# Patient Record
Sex: MALE | Race: BLACK OR AFRICAN AMERICAN | HISPANIC OR LATINO | Employment: FULL TIME | URBAN - METROPOLITAN AREA
[De-identification: names, ages, dates, MRNs, and addresses within clinical notes are randomized per-mention and may not be internally consistent; named-entity substitution may affect disease eponyms.]

---

## 2023-02-01 ENCOUNTER — OFFICE VISIT (OUTPATIENT)
Dept: FAMILY MEDICINE CLINIC | Facility: CLINIC | Age: 53
End: 2023-02-01

## 2023-02-01 VITALS
BODY MASS INDEX: 21.46 KG/M2 | OXYGEN SATURATION: 99 % | TEMPERATURE: 98 F | HEART RATE: 75 BPM | WEIGHT: 153.31 LBS | SYSTOLIC BLOOD PRESSURE: 124 MMHG | HEIGHT: 71 IN | DIASTOLIC BLOOD PRESSURE: 70 MMHG | RESPIRATION RATE: 21 BRPM

## 2023-02-01 DIAGNOSIS — R14.3 FLATULENCE: ICD-10-CM

## 2023-02-01 DIAGNOSIS — Z11.4 SCREENING FOR HIV (HUMAN IMMUNODEFICIENCY VIRUS): ICD-10-CM

## 2023-02-01 DIAGNOSIS — Z12.12 ENCOUNTER FOR COLORECTAL CANCER SCREENING: ICD-10-CM

## 2023-02-01 DIAGNOSIS — Z13.31 POSITIVE DEPRESSION SCREENING: ICD-10-CM

## 2023-02-01 DIAGNOSIS — R19.4 FREQUENT BOWEL MOVEMENTS: ICD-10-CM

## 2023-02-01 DIAGNOSIS — Z23 ENCOUNTER FOR IMMUNIZATION: ICD-10-CM

## 2023-02-01 DIAGNOSIS — Z11.59 NEED FOR HEPATITIS C SCREENING TEST: ICD-10-CM

## 2023-02-01 DIAGNOSIS — Z76.89 ENCOUNTER TO ESTABLISH CARE WITH NEW DOCTOR: Primary | ICD-10-CM

## 2023-02-01 DIAGNOSIS — Z12.11 ENCOUNTER FOR COLORECTAL CANCER SCREENING: ICD-10-CM

## 2023-02-01 RX ORDER — SIMETHICONE 180 MG
180 CAPSULE ORAL 2 TIMES DAILY PRN
Qty: 30 CAPSULE | Refills: 0 | Status: SHIPPED | OUTPATIENT
Start: 2023-02-01 | End: 2023-02-03 | Stop reason: SDUPTHER

## 2023-02-01 NOTE — PROGRESS NOTES
[facility] Office visit    Assessment/Plan:     1  Encounter to establish care with new doctor  -     CBC and differential; Future  -     Comprehensive metabolic panel; Future  -     Vitamin D 25 hydroxy; Future    2  Frequent bowel movements  Assessment & Plan:  - Patient reports flatulence and frequent bowel movements since abdominal surgeries in 2020 that resulted from questionable incident involving ingestion of alcohol/battery acid at home (see "Positive depression screening")  - Since then patient reports mixture of solid and loose bowel movements, no mucus or blood, no weight loss, fevers, night sweats  - Some components of diary in his diet however does not appear to be related as his symptoms coincide with time of abdominal surgeries, however patient instructed to cut back on dairy as well to see if symptoms improve  - Possible dumping syndrome as we do not have records of what kind of surgeries he had, however large open scar seen extending through entire abdomen and wife reported he had a feeding tube placed at one point  Instructed wife to try to obtain records  - Will start with dietary modifications, instructed patient to eat more frequent smaller meals, increase fluid intake with meals, high protein and fiber diet, decrease sugar intake      3  Positive depression screening  Assessment & Plan:  - PHQ-9 of 15 on initial visit to establish care  - Denied active SI/HI however patient with history of questionable incident in the past involving alcohol/battery acid intoxication at home which was unwitnessed, resulting in major abdominal surgeries as evidenced by large open scar across entire abdomen  It is unclear if the intoxication was attempt at self-harm and wife is unsure of his intents at the time as well  - As the patient's wife is also a patient of the clinic, it is reassuring that we will be able to follow up with the patient   Further evaluation at next visit    Orders:  -     TSH, 3rd generation with Free T4 reflex; Future    4  Flatulence  Assessment & Plan:  See "Frequent bowel movements"  - Simethicone for symptomatic relief    Orders:  -     simethicone (MYLICON,GAS-X) 774 MG capsule; Take 1 capsule (180 mg total) by mouth 2 (two) times a day as needed for flatulence for up to 30 doses    5  Need for hepatitis C screening test  -     Hepatitis C Antibody ([facility]); Future    6  Screening for HIV (human immunodeficiency virus)  -     HIV 1/2 AG/AB w Reflex [facility] for 2 yr old and above; Future    7  Encounter for colorectal cancer screening  -     Cologuard    8  Encounter for immunization  -     influenza vaccine, quadrivalent, recombinant, PF, 0 5 mL, for patients 18 yr+ (FLUBLOK)        Return in about 1 month (around 3/1/2023) for Labs follow up  Subjective:   CYNTHIA Navarro is a 45 y o  male new patient who presents to [facility] care  Wife is present at bedside, states she is also a patient at this clinic  Patient denies any current medical problems  Wife reports he was previously seeing a family medicine clinic [facility] and believes he was on some medications in the past but is unsure  He no longer works, but was a  in the past  Now he drives his wife to and from work and stays mostly at home  Today patient is worried about excessive flatuence and frequent bowel movements with meals, mixture of solid and loose stools, no mucous or blood  He reports symptoms began after emergent abdominal surgery 3 years ago when he was drinking alcohol at home that was mixed with battery acid  He states he had two abdominal surgeries, a feeding tube, and went to rehab  Patient reports he was "poisoned" however describes that he was alone and attributes it to "an accident " Wife states it was unwitnessed and is unsure if he was trying to harm himself at the time  Patient denies intention to harm himself during this episode   Currently he denies any SI / HI  As far as his GI symptoms, patient reports he does not eat much dairy  For breakfast he will have eggs and bread or some pasta, water or orange juice, sometimes chocolate milk, rarely coffee  For lunch he usually eats rice, corn, protein including chicken, goat, or beef, vegetables such as carrot, green beans, sometimes lettuce  For diner he will have bread, occasional pizza, or sandwich consisting of turkey and cheese  Otherwise denies nausea/vomiting, abdominal pain, chest pain, shortness of breath, dysuria or hematuria  Diet: As described in HPI above  Exercise: Does not have formal exercise  Stays at home, except to drive wife to and from work   Stress: Reports high stress level about financial situation and family  Wife states she tries her best but is the only source of income for them  Immunizations: States he has not had any COVID vaccines or COVID itself, but would like to hold off on COVID vaccine, as he is getting flu vaccine today  Social Hx: Drinks socially, wine or white label 1-2 nips on some weekends  Previous 11 PY smoker with cigarettes, then switched to 2-3 black and mild's daily for the past 3 years  Denies other drug use  Not currently sexually active, denies history of STD's  Denies dysuria, hematuria, or discharge  Depression Screening Follow-up Plan: Patient's depression screening was positive with a PHQ-2 score of 4  Their PHQ-9 score was 15  Patient advised to follow-up with PCP for further management  First visit today, will bring back for follow up and further discussion of depression  Denies SI/HI today, however unclear attempt of possible SI in the past      Review of Systems   Constitutional: Negative for chills and fever  HENT: Negative for ear pain and sore throat  Eyes: Negative for pain and visual disturbance  Respiratory: Negative for cough and shortness of breath  Cardiovascular: Negative for chest pain and palpitations     Gastrointestinal: Negative for abdominal pain, nausea and vomiting  Flatulence, loose bowel movements after meals   Genitourinary: Negative for dysuria, hematuria and penile discharge  Musculoskeletal: Negative for arthralgias and back pain  Skin: Negative for rash  Neurological: Negative for dizziness, light-headedness and headaches  All other systems reviewed and are negative       Objective:     /70 (BP Location: Left arm, Patient Position: Sitting, Cuff Size: Standard)   Pulse 75   Temp 98 °F (36 7 °C) (Temporal)   Resp 21   Ht 5' 11" (1 803 m)   Wt 69 5 kg (153 lb 5 oz)   SpO2 99%   BMI 21 38 kg/m²      Physical Exam     ** Please Note: This note has been constructed using a voice recognition system Horacio Ibarra MD  02/02/23  11:15 PM

## 2023-02-02 ENCOUNTER — TELEPHONE (OUTPATIENT)
Dept: FAMILY MEDICINE CLINIC | Facility: CLINIC | Age: 53
End: 2023-02-02

## 2023-02-02 PROBLEM — Z13.31 DEPRESSION SCREENING: Status: ACTIVE | Noted: 2023-02-02

## 2023-02-02 PROBLEM — R14.3 FLATULENCE: Status: ACTIVE | Noted: 2023-02-02

## 2023-02-02 PROBLEM — R19.4 FREQUENT BOWEL MOVEMENTS: Status: ACTIVE | Noted: 2023-02-02

## 2023-02-03 ENCOUNTER — NURSE TRIAGE (OUTPATIENT)
Dept: OTHER | Facility: OTHER | Age: 53
End: 2023-02-03

## 2023-02-03 DIAGNOSIS — R14.3 FLATULENCE: ICD-10-CM

## 2023-02-03 RX ORDER — SIMETHICONE 180 MG
180 CAPSULE ORAL 2 TIMES DAILY PRN
Qty: 30 CAPSULE | Refills: 0 | Status: SHIPPED | OUTPATIENT
Start: 2023-02-03

## 2023-02-03 RX ORDER — SIMETHICONE 180 MG
180 CAPSULE ORAL 2 TIMES DAILY PRN
Qty: 30 CAPSULE | Refills: 0 | Status: SHIPPED | OUTPATIENT
Start: 2023-02-03 | End: 2023-02-03 | Stop reason: SDUPTHER

## 2023-02-03 NOTE — ASSESSMENT & PLAN NOTE
- PHQ-9 of 15 on initial visit to establish care  - Denied active SI/HI however patient with history of questionable incident in the past involving alcohol/battery acid intoxication at home which was unwitnessed, resulting in major abdominal surgeries as evidenced by large open scar across entire abdomen  It is unclear if the intoxication was attempt at self-harm and wife is unsure of his intents at the time as well  - As the patient's wife is also a patient of the clinic, it is reassuring that we will be able to follow up with the patient   Further evaluation at next visit

## 2023-02-03 NOTE — ASSESSMENT & PLAN NOTE
- Patient reports flatulence and frequent bowel movements since abdominal surgeries in 2020 that resulted from questionable incident involving ingestion of alcohol/battery acid at home (see "Positive depression screening")  - Since then patient reports mixture of solid and loose bowel movements, no mucus or blood, no weight loss, fevers, night sweats  - Some components of diary in his diet however does not appear to be related as his symptoms coincide with time of abdominal surgeries, however patient instructed to cut back on dairy as well to see if symptoms improve  - Possible dumping syndrome as we do not have records of what kind of surgeries he had, however large open scar seen extending through entire abdomen and wife reported he had a feeding tube placed at one point   Instructed wife to try to obtain records  - Will start with dietary modifications, instructed patient to eat more frequent smaller meals, increase fluid intake with meals, high protein and fiber diet, decrease sugar intake

## 2023-02-03 NOTE — TELEPHONE ENCOUNTER
Prescription forsimethicone (MYLICON,GAS-X) 750 MG capsule not at pharmacy  No pharmacy was added to original prescription  Prescription resent and receipt confirmed at 5:44 PM     Reason for Disposition  • [1] Prescription not at pharmacy AND [2] was prescribed by PCP recently (Exception: triager has access to EMR and prescription is recorded there  Go to Home Care and confirm for pharmacy )    Answer Assessment - Initial Assessment Questions  1  NAME of MEDICATION: "What medicine are you calling about?"      Simethicone  2  QUESTION: "What is your question?" (e g , medication refill, side effect)      Prescription not at pharmacy  3  PRESCRIBING HCP: "Who prescribed it?" Reason: if prescribed by specialist, call should be referred to that group        Yaquelin Dietrich MD    Protocols used: MEDICATION QUESTION CALL-ADULT-

## 2023-02-03 NOTE — TELEPHONE ENCOUNTER
Regarding: medciation dispencing issue  ----- Message from Jen Harper sent at 2/3/2023  5:15 PM EST -----  "My  hd a OV yesterday and the Dr was suppose to call in a prescription simethicone (MYLICON,GAS-X) 935 MG capsule he needs to have this today                                                          Please send to [facility]/pharmacy #[id number] [facility] [place]

## 2023-03-01 ENCOUNTER — RA CDI HCC (OUTPATIENT)
Dept: OTHER | Facility: HOSPITAL | Age: 53
End: 2023-03-01

## 2023-03-01 NOTE — PROGRESS NOTES
[facility] 75  coding opportunities       Chart reviewed, no opportunity found: CHART REVIEWED, NO OPPORTUNITY FOUND        Patients Insurance        Commercial Insurance: Rebecca Whitney

## 2023-07-01 ENCOUNTER — HOSPITAL ENCOUNTER (EMERGENCY)
Facility: HOSPITAL | Age: 53
Discharge: HOME | End: 2023-07-01
Attending: EMERGENCY MEDICINE
Payer: COMMERCIAL

## 2023-07-01 VITALS
RESPIRATION RATE: 20 BRPM | TEMPERATURE: 97.4 F | SYSTOLIC BLOOD PRESSURE: 141 MMHG | HEART RATE: 71 BPM | DIASTOLIC BLOOD PRESSURE: 92 MMHG | OXYGEN SATURATION: 100 %

## 2023-07-01 DIAGNOSIS — G89.29 CHRONIC RIGHT SHOULDER PAIN: Primary | ICD-10-CM

## 2023-07-01 DIAGNOSIS — M25.511 CHRONIC RIGHT SHOULDER PAIN: Primary | ICD-10-CM

## 2023-07-01 PROCEDURE — 99282 EMERGENCY DEPT VISIT SF MDM: CPT

## 2023-07-01 PROCEDURE — 96372 THER/PROPH/DIAG INJ SC/IM: CPT

## 2023-07-01 RX ORDER — KETOROLAC TROMETHAMINE 30 MG/ML
30 INJECTION, SOLUTION INTRAMUSCULAR; INTRAVENOUS ONCE
Status: COMPLETED | OUTPATIENT
Start: 2023-07-01 | End: 2023-07-01

## 2023-07-01 RX ORDER — NAPROXEN 500 MG/1
500 TABLET ORAL 2 TIMES DAILY WITH MEALS
Qty: 30 TABLET | Refills: 0 | Status: SHIPPED | OUTPATIENT
Start: 2023-07-01

## 2023-07-01 RX ADMIN — KETOROLAC TROMETHAMINE 30 MG: 30 INJECTION, SOLUTION INTRAMUSCULAR at 04:51

## 2023-07-09 ENCOUNTER — ANESTHESIA EVENT (EMERGENCY)
Dept: PERIOP | Facility: HOSPITAL | Age: 53
End: 2023-07-09
Payer: COMMERCIAL

## 2023-07-09 ENCOUNTER — ANESTHESIA (EMERGENCY)
Dept: PERIOP | Facility: HOSPITAL | Age: 53
End: 2023-07-09
Payer: COMMERCIAL

## 2023-07-09 ENCOUNTER — APPOINTMENT (EMERGENCY)
Dept: RADIOLOGY | Facility: HOSPITAL | Age: 53
End: 2023-07-09
Payer: COMMERCIAL

## 2023-07-09 ENCOUNTER — APPOINTMENT (OUTPATIENT)
Dept: PERIOP | Facility: HOSPITAL | Age: 53
End: 2023-07-09
Attending: INTERNAL MEDICINE
Payer: COMMERCIAL

## 2023-07-09 ENCOUNTER — HOSPITAL ENCOUNTER (EMERGENCY)
Facility: HOSPITAL | Age: 53
Discharge: HOME | End: 2023-07-09
Attending: STUDENT IN AN ORGANIZED HEALTH CARE EDUCATION/TRAINING PROGRAM
Payer: COMMERCIAL

## 2023-07-09 VITALS
OXYGEN SATURATION: 100 % | SYSTOLIC BLOOD PRESSURE: 130 MMHG | HEART RATE: 86 BPM | RESPIRATION RATE: 20 BRPM | TEMPERATURE: 97 F | DIASTOLIC BLOOD PRESSURE: 80 MMHG

## 2023-07-09 DIAGNOSIS — T18.128A ESOPHAGEAL OBSTRUCTION DUE TO FOOD IMPACTION: Primary | ICD-10-CM

## 2023-07-09 DIAGNOSIS — K22.2 ESOPHAGEAL OBSTRUCTION DUE TO FOOD IMPACTION: Primary | ICD-10-CM

## 2023-07-09 DIAGNOSIS — K56.699 FOOD BOLUS OBSTRUCTION OF INTESTINE (HCC): ICD-10-CM

## 2023-07-09 PROBLEM — T88.4XXA DIFFICULT INTUBATION: Status: ACTIVE | Noted: 2023-07-09

## 2023-07-09 PROCEDURE — 70360 X-RAY EXAM OF NECK: CPT

## 2023-07-09 PROCEDURE — 43247 EGD REMOVE FOREIGN BODY: CPT | Performed by: INTERNAL MEDICINE

## 2023-07-09 PROCEDURE — 96375 TX/PRO/DX INJ NEW DRUG ADDON: CPT

## 2023-07-09 PROCEDURE — 96374 THER/PROPH/DIAG INJ IV PUSH: CPT

## 2023-07-09 PROCEDURE — 99283 EMERGENCY DEPT VISIT LOW MDM: CPT

## 2023-07-09 PROCEDURE — 31505 DIAGNOSTIC LARYNGOSCOPY: CPT

## 2023-07-09 PROCEDURE — NC001 PR NO CHARGE: Performed by: INTERNAL MEDICINE

## 2023-07-09 RX ORDER — GLYCOPYRROLATE 0.2 MG/ML
INJECTION INTRAMUSCULAR; INTRAVENOUS AS NEEDED
Status: DISCONTINUED | OUTPATIENT
Start: 2023-07-09 | End: 2023-07-09

## 2023-07-09 RX ORDER — ROCURONIUM BROMIDE 10 MG/ML
INJECTION, SOLUTION INTRAVENOUS AS NEEDED
Status: DISCONTINUED | OUTPATIENT
Start: 2023-07-09 | End: 2023-07-09

## 2023-07-09 RX ORDER — ONDANSETRON 2 MG/ML
4 INJECTION INTRAMUSCULAR; INTRAVENOUS ONCE AS NEEDED
Status: DISCONTINUED | OUTPATIENT
Start: 2023-07-09 | End: 2023-07-09 | Stop reason: HOSPADM

## 2023-07-09 RX ORDER — LORAZEPAM 2 MG/ML
0.5 INJECTION INTRAMUSCULAR ONCE
Status: COMPLETED | OUTPATIENT
Start: 2023-07-09 | End: 2023-07-09

## 2023-07-09 RX ORDER — FENTANYL CITRATE/PF 50 MCG/ML
50 SYRINGE (ML) INJECTION
Status: DISCONTINUED | OUTPATIENT
Start: 2023-07-09 | End: 2023-07-09 | Stop reason: HOSPADM

## 2023-07-09 RX ORDER — DEXMEDETOMIDINE HYDROCHLORIDE 100 UG/ML
INJECTION, SOLUTION INTRAVENOUS AS NEEDED
Status: DISCONTINUED | OUTPATIENT
Start: 2023-07-09 | End: 2023-07-09

## 2023-07-09 RX ORDER — KETOROLAC TROMETHAMINE 30 MG/ML
15 INJECTION, SOLUTION INTRAMUSCULAR; INTRAVENOUS ONCE
Status: COMPLETED | OUTPATIENT
Start: 2023-07-09 | End: 2023-07-09

## 2023-07-09 RX ORDER — LIDOCAINE HYDROCHLORIDE 40 MG/ML
2.5 INJECTION, SOLUTION RETROBULBAR; TOPICAL ONCE
Status: COMPLETED | OUTPATIENT
Start: 2023-07-09 | End: 2023-07-09

## 2023-07-09 RX ORDER — MIDAZOLAM HYDROCHLORIDE 2 MG/2ML
INJECTION, SOLUTION INTRAMUSCULAR; INTRAVENOUS AS NEEDED
Status: DISCONTINUED | OUTPATIENT
Start: 2023-07-09 | End: 2023-07-09

## 2023-07-09 RX ORDER — SODIUM CHLORIDE, SODIUM LACTATE, POTASSIUM CHLORIDE, CALCIUM CHLORIDE 600; 310; 30; 20 MG/100ML; MG/100ML; MG/100ML; MG/100ML
20 INJECTION, SOLUTION INTRAVENOUS CONTINUOUS
Status: DISCONTINUED | OUTPATIENT
Start: 2023-07-09 | End: 2023-07-10 | Stop reason: HOSPADM

## 2023-07-09 RX ORDER — SODIUM CHLORIDE, SODIUM LACTATE, POTASSIUM CHLORIDE, CALCIUM CHLORIDE 600; 310; 30; 20 MG/100ML; MG/100ML; MG/100ML; MG/100ML
INJECTION, SOLUTION INTRAVENOUS CONTINUOUS PRN
Status: DISCONTINUED | OUTPATIENT
Start: 2023-07-09 | End: 2023-07-09

## 2023-07-09 RX ORDER — PROPOFOL 10 MG/ML
INJECTION, EMULSION INTRAVENOUS AS NEEDED
Status: DISCONTINUED | OUTPATIENT
Start: 2023-07-09 | End: 2023-07-09

## 2023-07-09 RX ORDER — HYDROMORPHONE HCL/PF 1 MG/ML
0.5 SYRINGE (ML) INJECTION
Status: DISCONTINUED | OUTPATIENT
Start: 2023-07-09 | End: 2023-07-09 | Stop reason: HOSPADM

## 2023-07-09 RX ORDER — DIPHENHYDRAMINE HYDROCHLORIDE 50 MG/ML
12.5 INJECTION INTRAMUSCULAR; INTRAVENOUS ONCE AS NEEDED
Status: DISCONTINUED | OUTPATIENT
Start: 2023-07-09 | End: 2023-07-09 | Stop reason: HOSPADM

## 2023-07-09 RX ADMIN — MIDAZOLAM 1 MG: 1 INJECTION INTRAMUSCULAR; INTRAVENOUS at 22:16

## 2023-07-09 RX ADMIN — GLUCAGON 1 MG: 1 INJECTION, POWDER, LYOPHILIZED, FOR SOLUTION INTRAMUSCULAR; INTRAVENOUS at 20:39

## 2023-07-09 RX ADMIN — LORAZEPAM 0.5 MG: 2 INJECTION INTRAMUSCULAR; INTRAVENOUS at 20:42

## 2023-07-09 RX ADMIN — GLYCOPYRROLATE 0.4 MG: 0.2 INJECTION, SOLUTION INTRAMUSCULAR; INTRAVENOUS at 22:14

## 2023-07-09 RX ADMIN — SODIUM CHLORIDE, SODIUM LACTATE, POTASSIUM CHLORIDE, AND CALCIUM CHLORIDE: .6; .31; .03; .02 INJECTION, SOLUTION INTRAVENOUS at 22:10

## 2023-07-09 RX ADMIN — PROPOFOL 30 MG: 10 INJECTION, EMULSION INTRAVENOUS at 22:14

## 2023-07-09 RX ADMIN — LIDOCAINE HYDROCHLORIDE 2.5 ML: 40 INJECTION, SOLUTION RETROBULBAR; TOPICAL at 20:01

## 2023-07-09 RX ADMIN — MIDAZOLAM 1 MG: 1 INJECTION INTRAMUSCULAR; INTRAVENOUS at 22:12

## 2023-07-09 RX ADMIN — PROPOFOL 100 MG: 10 INJECTION, EMULSION INTRAVENOUS at 22:19

## 2023-07-09 RX ADMIN — KETOROLAC TROMETHAMINE 15 MG: 30 INJECTION, SOLUTION INTRAMUSCULAR at 20:38

## 2023-07-09 RX ADMIN — DEXMEDETOMIDINE 8 MCG: 100 INJECTION, SOLUTION, CONCENTRATE INTRAVENOUS at 22:13

## 2023-07-09 RX ADMIN — ROCURONIUM BROMIDE 25 MG: 10 INJECTION, SOLUTION INTRAVENOUS at 22:24

## 2023-07-09 RX ADMIN — PROPOFOL 50 MG: 10 INJECTION, EMULSION INTRAVENOUS at 22:16

## 2023-07-09 RX ADMIN — SUGAMMADEX 200 MG: 100 INJECTION, SOLUTION INTRAVENOUS at 22:40

## 2023-07-09 NOTE — ED PROVIDER NOTES
History  Chief Complaint   Patient presents with   • Foreign Body in [facility] A BONE IN IT IS STUCK IN HIS THROAT, SPITTING UP MUCOUS. UNABLE TO SWALLOW THIS. STATES PREVIOUS TRACH AND STATES AIRWAY IS SMALLER AND DEFORMED SINCE THIS. POINTING TO NECK THAT IT IS RIGHT THERE     Patient is a 58-year-old male,PMHx including s/p trach, who presents to the emergency department for difficulty swallowing. Patient states he was eating dinner just prior to arrival (something with a bone) when he began to choke. He states since then he has been unable to swallow. He has been spitting up. He now presents for further evaluation. Patient states he feels like there is something stuck. He also has significant left neck discomfort. No difficulty breathing. No other complaints or concerns. Prior to Admission Medications   Prescriptions Last Dose Informant Patient Reported? Taking?   naproxen (Naprosyn) 500 mg tablet 7/8/2023 at 0900  No Yes   Sig: Take 1 tablet (500 mg total) by mouth 2 (two) times a day with meals   simethicone (MYLICON,GAS-X) 900 MG capsule 7/8/2023 at 0900  No Yes   Sig: Take 1 capsule (180 mg total) by mouth 2 (two) times a day as needed for flatulence for up to 30 doses      Facility-Administered Medications: None       History reviewed. No pertinent past medical history. Past Surgical History:   Procedure Laterality Date   • ABDOMINAL SURGERY  2020    Abdominal surgeries after "poisoning" by ingesting alcohol/battery acid   • TRACHEAL SURGERY         History reviewed. No pertinent family history. I have reviewed and agree with the history as documented.     E-Cigarette/Vaping   • E-Cigarette Use Never User      E-Cigarette/Vaping Substances     Social History     Tobacco Use   • Smoking status: Every Day     Packs/day: 0.25     Years: 11.00     Total pack years: 2.75     Types: Cigarettes     Last attempt to quit: 2020     Years since quitting: 3.5   • Smokeless tobacco: Never   • Tobacco comments:     2 black and milds per day since 3 years ago, previously 6 PY cigarettes   Vaping Use   • Vaping Use: Never used   Substance Use Topics   • Alcohol use: Not Currently     Alcohol/week: 1.0 standard drink of alcohol     Types: 1 Shots of liquor per week     Comment: 1 or 2 nips of whiskey on the weekends   • Drug use: Never       Review of Systems   HENT: Positive for trouble swallowing. Gastrointestinal: Positive for vomiting. All other systems reviewed and are negative. Physical Exam  Physical Exam  Vitals and nursing note reviewed. Constitutional:       General: He is in acute distress. Appearance: He is well-developed. He is not ill-appearing, toxic-appearing or diaphoretic. Comments: Patient spitting frequently into emesis bag. HENT:      Head: Normocephalic and atraumatic. Right Ear: External ear normal.      Left Ear: External ear normal.      Nose: Nose normal.   Eyes:      General: Lids are normal. No scleral icterus. Cardiovascular:      Rate and Rhythm: Normal rate and regular rhythm. Heart sounds: Normal heart sounds. No murmur heard. No friction rub. No gallop. Pulmonary:      Effort: Pulmonary effort is normal. No respiratory distress. Breath sounds: Normal breath sounds. No wheezing or rales. Musculoskeletal:         General: No deformity. Normal range of motion. Cervical back: Normal range of motion and neck supple. Skin:     General: Skin is warm and dry. Neurological:      General: No focal deficit present. Mental Status: He is alert.    Psychiatric:         Mood and Affect: Mood normal.         Behavior: Behavior normal.         Vital Signs  ED Triage Vitals [07/09/23 1859]   Temperature Pulse Respirations Blood Pressure SpO2   98 °F (36.7 °C) 84 (!) 24 135/86 98 %      Temp Source Heart Rate Source Patient Position - Orthostatic VS BP Location FiO2 (%)   Tympanic Monitor Sitting Right arm --      Pain Score       9           Vitals:    07/09/23 2144 07/09/23 2245 07/09/23 2300 07/09/23 2313   BP: 134/84 131/85 131/86 130/80   Pulse: 69 97 92 86   Patient Position - Orthostatic VS:             Visual Acuity      ED Medications  Medications   lactated ringers infusion (has no administration in time range)   lidocaine HCl (PF) (XYLOCAINE) 4 % injection 2.5 mL (2.5 mL Inhalation Given 7/9/23 2001)   glucagon (GLUCAGEN) injection 1 mg (1 mg Intravenous Given 7/9/23 2039)   ketorolac (TORADOL) injection 15 mg (15 mg Intravenous Given 7/9/23 2038)   LORazepam (ATIVAN) injection 0.5 mg (0.5 mg Intravenous Given 7/9/23 2042)       Diagnostic Studies  Results Reviewed     None                 XR neck soft tissue    (Results Pending)   FL upper GI UGI    (Results Pending)              Procedures  Procedures         ED Course  ED Course as of 07/09/23 2318   Sun Jul 09, 2023 2014 Discussed with GI. Recommends trialing glucagon. 2045 Again discussed with GI. No improvement with glucagon. Still unable to tolerate p.o. Will mobilize team.   2148 Patient off to the OR for EGD. Medical Decision Making  Patient is a 45 y.o. male who presents to the ED for likely esophageal food bolus. Patient is nontoxic but in acute distress. Vitals are stable. Physical exam is unremarkable. Clinical impression is food bolus      Plan: Given patient is having discomfort in the left side of his neck and he was eating a bone will evaluate with plain films. Will also perform flexible bronc to see if any foreign bodies can be visualized. If this is unsuccessful will discuss with GI. Portions of the record may have been created with voice recognition software. Occasional wrong word or "sound a like" substitutions may have occurred due to the inherent limitations of voice recognition software.  Read the chart carefully and recognize, using context, where substitutions have occurred. Esophageal obstruction due to food impaction: acute illness or injury  Amount and/or Complexity of Data Reviewed  Radiology: ordered. Risk  Prescription drug management. Disposition  Final diagnoses:   Esophageal obstruction due to food impaction     Time reflects when diagnosis was documented in both MDM as applicable and the Disposition within this note     Time User Action Codes Description Comment    7/9/2023  9:38 PM Melanie Andrade Add [C58.690] Food bolus obstruction of intestine ([address])     7/9/2023  9:46 PM Hosea Gonzales Add [K22.2,  C89.760X] Esophageal obstruction due to food impaction       ED Disposition     ED Disposition   Send to OR    Condition   --    Date/Time   Sun Jul 9, 2023  9:54 PM    Comment   --         Follow-up Information    None         Current Discharge Medication List      CONTINUE these medications which have NOT CHANGED    Details   naproxen (Naprosyn) 500 mg tablet Take 1 tablet (500 mg total) by mouth 2 (two) times a day with meals  Qty: 30 tablet, Refills: 0    Associated Diagnoses: Chronic right shoulder pain      simethicone (MYLICON,GAS-X) 014 MG capsule Take 1 capsule (180 mg total) by mouth 2 (two) times a day as needed for flatulence for up to 30 doses  Qty: 30 capsule, Refills: 0    Associated Diagnoses: Flatulence             Outpatient Discharge Orders   FL upper GI UGI   Standing Status: Future Standing Exp.  Date: 07/09/27       PDMP Review     None          ED Provider  Electronically Signed by           Tre Benson DO  07/09/23 2193

## 2023-07-10 NOTE — ED NOTES
OK to give wife update per pt. She is updated that pt will be seen by GI for a scope.       Celestino Brown RN  07/09/23 8746

## 2023-07-10 NOTE — RESPIRATORY THERAPY NOTE
Assisted with bedside bronch with ER MD.Lidocaine neb administered prior to scope insertion.  Pt tolerated well

## 2023-07-10 NOTE — ANESTHESIA POSTPROCEDURE EVALUATION
Post-Op Assessment Note    CV Status:  Stable  Pain Score: 0    Pain management: adequate     Mental Status:  Alert and awake   Hydration Status:  Euvolemic   PONV Controlled:  Controlled   Airway Patency:  Patent  Airway: intubated      Post Op Vitals Reviewed: Yes      Staff: Anesthesiologist         No notable events documented.     /85 (07/09/23 2245)    Temp (!) 97 °F (36.1 °C) (07/09/23 2245)    Pulse 97 (07/09/23 2245)   Resp 22 (07/09/23 2245)    SpO2 98 % (07/09/23 2245)

## 2023-07-10 NOTE — H&P
H&P EXAM - Outpatient Endoscopy   Billy Gonzalez 45 y.o. male MRN: [id number]    [address]   Encounter: [id number]        Impression: Ac dysphagia    Plan:EGD    Chief Complaint: Dysphagia    Physical Exam: VSS   Chest: distant BS   Heart: RSR

## 2023-07-10 NOTE — ED NOTES
Bedside bronchoscopy performed by Dr. Kandi Rey. RT and this RN at bedside.       Mukesh Trejo RN  07/09/23 2014

## 2023-07-10 NOTE — ANESTHESIA PREPROCEDURE EVALUATION
Procedure:  EGD    Relevant Problems   ANESTHESIA   (+) Difficult intubation      S/p ingestion of battery 4 years ago, requiring trach and neck reconstruction. No records available for review. Done at [facility]. No surgery since decannulation. Physical Exam    Airway    Mallampati score: II  TM Distance: >3 FB  Neck ROM: full     Dental   Comment: Denies loose teeth,     Cardiovascular  Cardiovascular exam normal    Pulmonary  Pulmonary exam normal     Other Findings  Portions of exam deferred due to low yield and/or unknown COVID status      Anesthesia Plan  ASA Score- 2 Emergent    Anesthesia Type- general with ASA Monitors. Additional Monitors:   Airway Plan:           Plan Factors-Exercise tolerance (METS): >4 METS. Chart reviewed. Existing labs reviewed. Patient summary reviewed. Patient is not a current smoker. Induction- intravenous. Postoperative Plan-     Informed Consent- Anesthetic plan and risks discussed with patient. I personally reviewed this patient with the CRNA. Discussed and agreed on the Anesthesia Plan with the CRNA. Abelino Schwab Discussed with patient and GI physician. Plan to sedate/keep SV. Will do VL to see if airway canbe secured. Possible that we may wake up pt and abort.

## 2023-11-15 ENCOUNTER — HOSPITAL ENCOUNTER (EMERGENCY)
Facility: HOSPITAL | Age: 53
Discharge: HOME | End: 2023-11-15
Attending: STUDENT IN AN ORGANIZED HEALTH CARE EDUCATION/TRAINING PROGRAM
Payer: COMMERCIAL

## 2023-11-15 VITALS
SYSTOLIC BLOOD PRESSURE: 137 MMHG | WEIGHT: 145.7 LBS | OXYGEN SATURATION: 99 % | HEART RATE: 93 BPM | DIASTOLIC BLOOD PRESSURE: 92 MMHG | RESPIRATION RATE: 18 BRPM | BODY MASS INDEX: 20.4 KG/M2 | HEIGHT: 71 IN | TEMPERATURE: 97.7 F

## 2023-11-15 DIAGNOSIS — K08.89 DENTALGIA: Primary | ICD-10-CM

## 2023-11-15 PROCEDURE — 96372 THER/PROPH/DIAG INJ SC/IM: CPT

## 2023-11-15 PROCEDURE — 99284 EMERGENCY DEPT VISIT MOD MDM: CPT | Performed by: STUDENT IN AN ORGANIZED HEALTH CARE EDUCATION/TRAINING PROGRAM

## 2023-11-15 PROCEDURE — 99282 EMERGENCY DEPT VISIT SF MDM: CPT

## 2023-11-15 RX ORDER — CHLORHEXIDINE GLUCONATE ORAL RINSE 1.2 MG/ML
15 SOLUTION DENTAL 2 TIMES DAILY
Qty: 120 ML | Refills: 0 | Status: SHIPPED | OUTPATIENT
Start: 2023-11-15

## 2023-11-15 RX ORDER — KETOROLAC TROMETHAMINE 30 MG/ML
15 INJECTION, SOLUTION INTRAMUSCULAR; INTRAVENOUS ONCE
Status: COMPLETED | OUTPATIENT
Start: 2023-11-15 | End: 2023-11-15

## 2023-11-15 RX ORDER — AMOXICILLIN AND CLAVULANATE POTASSIUM 875; 125 MG/1; MG/1
1 TABLET, FILM COATED ORAL EVERY 12 HOURS
Qty: 14 TABLET | Refills: 0 | Status: SHIPPED | OUTPATIENT
Start: 2023-11-15 | End: 2023-11-22

## 2023-11-15 RX ORDER — ACETAMINOPHEN 325 MG/1
650 TABLET ORAL ONCE
Status: COMPLETED | OUTPATIENT
Start: 2023-11-15 | End: 2023-11-15

## 2023-11-15 RX ORDER — AMOXICILLIN AND CLAVULANATE POTASSIUM 875; 125 MG/1; MG/1
1 TABLET, FILM COATED ORAL ONCE
Status: COMPLETED | OUTPATIENT
Start: 2023-11-15 | End: 2023-11-15

## 2023-11-15 RX ADMIN — AMOXICILLIN AND CLAVULANATE POTASSIUM 1 TABLET: 875; 125 TABLET, FILM COATED ORAL at 11:28

## 2023-11-15 RX ADMIN — KETOROLAC TROMETHAMINE 15 MG: 30 INJECTION, SOLUTION INTRAMUSCULAR; INTRAVENOUS at 11:30

## 2023-11-15 RX ADMIN — ACETAMINOPHEN 650 MG: 325 TABLET ORAL at 11:28
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
## 2023-11-15 NOTE — DISCHARGE INSTRUCTIONS
You were seen in the Emergency Department for tooth pain. Please use the following pain medications as prescribed:  - Tylenol 650mg every 6 hours  - Motrin 400mg every 6 hours  - Orajel OTC  They work in different ways so can be used together at the same time. You were given a prescription for peridex wash. This is an anti-septic which will sterilize the area and prevent and infection from occurring. This can only be used for 2-3 weeks. If you use it longer, it might cause some pink staining of your teeth. It works like a super powered form of Listerine. You were also given a prescription antibiotics. Please take as prescribed. This should help the pain and swelling. If despite the above you have worsening symptoms please return to the emergency room for re-evaluation. If you HAVE A DENTIST, please call to make an appointment with them for follow-up as soon as possible. You may also follow-up with the [facility] if you don't have a density.

## 2023-11-19 ENCOUNTER — HOSPITAL ENCOUNTER (EMERGENCY)
Facility: HOSPITAL | Age: 53
Discharge: HOME | End: 2023-11-20
Attending: EMERGENCY MEDICINE | Admitting: EMERGENCY MEDICINE
Payer: COMMERCIAL

## 2023-11-19 ENCOUNTER — APPOINTMENT (EMERGENCY)
Dept: RADIOLOGY | Facility: HOSPITAL | Age: 53
End: 2023-11-19
Payer: COMMERCIAL

## 2023-11-19 DIAGNOSIS — K56.699 FOOD BOLUS OBSTRUCTION OF INTESTINE: Primary | ICD-10-CM

## 2023-11-19 DIAGNOSIS — T18.128A ESOPHAGEAL OBSTRUCTION DUE TO FOOD IMPACTION: ICD-10-CM
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
## 2023-11-19 PROCEDURE — 99285 EMERGENCY DEPT VISIT HI MDM: CPT | Performed by: EMERGENCY MEDICINE

## 2023-11-19 PROCEDURE — 96374 THER/PROPH/DIAG INJ IV PUSH: CPT

## 2023-11-19 PROCEDURE — 70360 X-RAY EXAM OF NECK: CPT

## 2023-11-19 PROCEDURE — 99283 EMERGENCY DEPT VISIT LOW MDM: CPT

## 2023-11-19 RX ADMIN — GLUCAGON 1 MG: KIT at 23:21

## 2023-11-20 ENCOUNTER — ANESTHESIA EVENT (EMERGENCY)
Dept: PERIOP | Facility: HOSPITAL | Age: 53
End: 2023-11-20
Payer: COMMERCIAL

## 2023-11-20 ENCOUNTER — APPOINTMENT (OUTPATIENT)
Dept: PERIOP | Facility: HOSPITAL | Age: 53
End: 2023-11-20
Attending: INTERNAL MEDICINE
Payer: COMMERCIAL

## 2023-11-20 ENCOUNTER — ANESTHESIA (EMERGENCY)
Dept: PERIOP | Facility: HOSPITAL | Age: 53
End: 2023-11-20
Payer: COMMERCIAL

## 2023-11-20 VITALS
DIASTOLIC BLOOD PRESSURE: 77 MMHG | OXYGEN SATURATION: 97 % | TEMPERATURE: 99.2 F | BODY MASS INDEX: 19.67 KG/M2 | WEIGHT: 141 LBS | SYSTOLIC BLOOD PRESSURE: 136 MMHG | HEART RATE: 78 BPM | RESPIRATION RATE: 22 BRPM

## 2023-11-20 RX ORDER — LIDOCAINE HYDROCHLORIDE 10 MG/ML
INJECTION, SOLUTION EPIDURAL; INFILTRATION; INTRACAUDAL; PERINEURAL AS NEEDED
Status: DISCONTINUED | OUTPATIENT
Start: 2023-11-20 | End: 2023-11-20

## 2023-11-20 RX ORDER — MIDAZOLAM HYDROCHLORIDE 2 MG/2ML
INJECTION, SOLUTION INTRAMUSCULAR; INTRAVENOUS AS NEEDED
Status: DISCONTINUED | OUTPATIENT
Start: 2023-11-20 | End: 2023-11-20

## 2023-11-20 RX ORDER — GLYCOPYRROLATE 0.2 MG/ML
INJECTION INTRAMUSCULAR; INTRAVENOUS AS NEEDED
Status: DISCONTINUED | OUTPATIENT
Start: 2023-11-20 | End: 2023-11-20

## 2023-11-20 RX ORDER — ONDANSETRON 2 MG/ML
4 INJECTION INTRAMUSCULAR; INTRAVENOUS ONCE AS NEEDED
Status: DISCONTINUED | OUTPATIENT
Start: 2023-11-20 | End: 2023-11-20 | Stop reason: HOSPADM

## 2023-11-20 RX ORDER — SODIUM CHLORIDE, SODIUM LACTATE, POTASSIUM CHLORIDE, CALCIUM CHLORIDE 600; 310; 30; 20 MG/100ML; MG/100ML; MG/100ML; MG/100ML
INJECTION, SOLUTION INTRAVENOUS CONTINUOUS PRN
Status: DISCONTINUED | OUTPATIENT
Start: 2023-11-20 | End: 2023-11-20

## 2023-11-20 RX ORDER — PROPOFOL 10 MG/ML
INJECTION, EMULSION INTRAVENOUS AS NEEDED
Status: DISCONTINUED | OUTPATIENT
Start: 2023-11-20 | End: 2023-11-20

## 2023-11-20 RX ORDER — SUCCINYLCHOLINE/SOD CL,ISO/PF 100 MG/5ML
SYRINGE (ML) INTRAVENOUS AS NEEDED
Status: DISCONTINUED | OUTPATIENT
Start: 2023-11-20 | End: 2023-11-20

## 2023-11-20 RX ORDER — FENTANYL CITRATE/PF 50 MCG/ML
25 SYRINGE (ML) INJECTION
Status: DISCONTINUED | OUTPATIENT
Start: 2023-11-20 | End: 2023-11-20 | Stop reason: HOSPADM

## 2023-11-20 RX ADMIN — SODIUM CHLORIDE, SODIUM LACTATE, POTASSIUM CHLORIDE, CALCIUM CHLORIDE: 600; 310; 30; 20 INJECTION, SOLUTION INTRAVENOUS at 00:16

## 2023-11-20 RX ADMIN — PROPOFOL 170 MG: 10 INJECTION, EMULSION INTRAVENOUS at 00:21

## 2023-11-20 RX ADMIN — MIDAZOLAM HYDROCHLORIDE 2 MG: 2 INJECTION, SOLUTION INTRAMUSCULAR; INTRAVENOUS at 00:17

## 2023-11-20 RX ADMIN — PROPOFOL 30 MG: 10 INJECTION, EMULSION INTRAVENOUS at 00:20

## 2023-11-20 RX ADMIN — LIDOCAINE HYDROCHLORIDE 50 MG: 10 INJECTION, SOLUTION EPIDURAL; INFILTRATION; INTRACAUDAL; PERINEURAL at 00:20

## 2023-11-20 RX ADMIN — GLYCOPYRROLATE 0.2 MG: 0.2 INJECTION INTRAMUSCULAR; INTRAVENOUS at 00:16

## 2023-11-20 RX ADMIN — Medication 100 MG: at 00:20
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
## 2023-11-20 NOTE — ANESTHESIA POSTPROCEDURE EVALUATION
Post-Op Assessment Note    CV Status:  Stable  Pain Score: 0    Pain management: adequate    Multimodal analgesia used between 6 hours prior to anesthesia start to PACU discharge    Mental Status:  Alert and awake   Hydration Status:  Euvolemic   PONV Controlled:  Controlled   Airway Patency:  Patent     Post Op Vitals Reviewed: Yes    No anethesia notable event occurred.     Staff: CRNA               BP   111/62   Temp 97.2   Pulse 92   Resp 14   SpO2 99

## 2024-01-19 ENCOUNTER — APPOINTMENT (EMERGENCY)
Dept: RADIOLOGY | Facility: HOSPITAL | Age: 54
End: 2024-01-19
Payer: COMMERCIAL

## 2024-01-19 ENCOUNTER — HOSPITAL ENCOUNTER (EMERGENCY)
Facility: HOSPITAL | Age: 54
Discharge: HOME | End: 2024-01-19
Attending: STUDENT IN AN ORGANIZED HEALTH CARE EDUCATION/TRAINING PROGRAM
Payer: COMMERCIAL

## 2024-01-19 VITALS
OXYGEN SATURATION: 100 % | HEART RATE: 68 BPM | BODY MASS INDEX: 19.64 KG/M2 | DIASTOLIC BLOOD PRESSURE: 87 MMHG | SYSTOLIC BLOOD PRESSURE: 139 MMHG | TEMPERATURE: 97.5 F | WEIGHT: 140.8 LBS | RESPIRATION RATE: 18 BRPM

## 2024-01-19 DIAGNOSIS — R09.A2 GLOBUS SENSATION: Primary | ICD-10-CM

## 2024-01-19 PROCEDURE — 70360 X-RAY EXAM OF NECK: CPT

## 2024-01-19 PROCEDURE — 99284 EMERGENCY DEPT VISIT MOD MDM: CPT | Performed by: STUDENT IN AN ORGANIZED HEALTH CARE EDUCATION/TRAINING PROGRAM

## 2024-01-19 PROCEDURE — 99283 EMERGENCY DEPT VISIT LOW MDM: CPT

## 2024-01-19 PROCEDURE — 96374 THER/PROPH/DIAG INJ IV PUSH: CPT

## 2024-01-19 RX ORDER — LIDOCAINE HYDROCHLORIDE 20 MG/ML
15 SOLUTION OROPHARYNGEAL ONCE
Status: COMPLETED | OUTPATIENT
Start: 2024-01-19 | End: 2024-01-19

## 2024-01-19 RX ADMIN — LIDOCAINE HYDROCHLORIDE 15 ML: 20 SOLUTION ORAL at 20:49

## 2024-01-19 RX ADMIN — GLUCAGON 1 MG: 1 INJECTION, POWDER, LYOPHILIZED, FOR SOLUTION INTRAMUSCULAR; INTRAVENOUS at 20:40
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
## 2024-01-20 NOTE — ED NOTES
Pt requesting to stay and sleep longer in ED. Pt advised he is discharged and has a referral to gastroenterology. Lights turned on, covers removed.      Samantha M Goodell, RN  01/19/24 7330

## 2024-01-20 NOTE — DISCHARGE INSTRUCTIONS
You were seen in the ED for a foreign body sensation in your throat. You were able to swallow. As discussed please follow up with gastroenterology. Call to schedule an appointment. Return to the ED for any new or concerning symptoms.

## 2024-02-25 ENCOUNTER — HOSPITAL ENCOUNTER (EMERGENCY)
Facility: HOSPITAL | Age: 54
Discharge: HOME | End: 2024-02-25
Attending: EMERGENCY MEDICINE
Payer: COMMERCIAL

## 2024-02-25 VITALS
SYSTOLIC BLOOD PRESSURE: 146 MMHG | TEMPERATURE: 96.9 F | OXYGEN SATURATION: 99 % | BODY MASS INDEX: 20.22 KG/M2 | DIASTOLIC BLOOD PRESSURE: 88 MMHG | HEART RATE: 80 BPM | RESPIRATION RATE: 18 BRPM | WEIGHT: 145 LBS

## 2024-02-25 DIAGNOSIS — K08.89 PAIN, DENTAL: Primary | ICD-10-CM

## 2024-02-25 PROCEDURE — 96372 THER/PROPH/DIAG INJ SC/IM: CPT

## 2024-02-25 PROCEDURE — 99284 EMERGENCY DEPT VISIT MOD MDM: CPT | Performed by: EMERGENCY MEDICINE

## 2024-02-25 PROCEDURE — 99282 EMERGENCY DEPT VISIT SF MDM: CPT

## 2024-02-25 RX ORDER — KETOROLAC TROMETHAMINE 30 MG/ML
15 INJECTION, SOLUTION INTRAMUSCULAR; INTRAVENOUS ONCE
Status: COMPLETED | OUTPATIENT
Start: 2024-02-25 | End: 2024-02-25

## 2024-02-25 RX ORDER — PENICILLIN V POTASSIUM 250 MG/1
500 TABLET ORAL ONCE
Status: COMPLETED | OUTPATIENT
Start: 2024-02-25 | End: 2024-02-25

## 2024-02-25 RX ORDER — NAPROXEN 500 MG/1
500 TABLET ORAL 2 TIMES DAILY WITH MEALS
Qty: 14 TABLET | Refills: 0 | Status: SHIPPED | OUTPATIENT
Start: 2024-02-25 | End: 2024-03-03

## 2024-02-25 RX ORDER — PENICILLIN V POTASSIUM 500 MG/1
500 TABLET ORAL 3 TIMES DAILY
Qty: 21 TABLET | Refills: 0 | Status: SHIPPED | OUTPATIENT
Start: 2024-02-25 | End: 2024-03-03

## 2024-02-25 RX ADMIN — KETOROLAC TROMETHAMINE 15 MG: 30 INJECTION, SOLUTION INTRAMUSCULAR; INTRAVENOUS at 20:09

## 2024-02-25 RX ADMIN — PENICILLIN V POTASSIUM 500 MG: 250 TABLET, FILM COATED ORAL at 20:13
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
## 2024-04-29 ENCOUNTER — HOSPITAL ENCOUNTER (EMERGENCY)
Facility: HOSPITAL | Age: 54
Discharge: HOME | End: 2024-04-29
Attending: EMERGENCY MEDICINE
Payer: COMMERCIAL

## 2024-04-29 ENCOUNTER — APPOINTMENT (EMERGENCY)
Dept: RADIOLOGY | Facility: HOSPITAL | Age: 54
End: 2024-04-29
Payer: COMMERCIAL

## 2024-04-29 VITALS
HEART RATE: 84 BPM | OXYGEN SATURATION: 95 % | RESPIRATION RATE: 16 BRPM | DIASTOLIC BLOOD PRESSURE: 72 MMHG | WEIGHT: 153 LBS | BODY MASS INDEX: 21.34 KG/M2 | SYSTOLIC BLOOD PRESSURE: 115 MMHG | TEMPERATURE: 98.2 F

## 2024-04-29 DIAGNOSIS — K61.0 PERIANAL ABSCESS: Primary | ICD-10-CM

## 2024-04-29 LAB
ANION GAP SERPL CALCULATED.3IONS-SCNC: 4 MMOL/L (ref 4–13)
BASOPHILS # BLD AUTO: 0.05 THOUSANDS/ÂΜL (ref 0–0.1)
BASOPHILS NFR BLD AUTO: 1 % (ref 0–1)
BUN SERPL-MCNC: 15 MG/DL (ref 5–25)
CALCIUM SERPL-MCNC: 8.2 MG/DL (ref 8.4–10.2)
CHLORIDE SERPL-SCNC: 111 MMOL/L (ref 96–108)
CO2 SERPL-SCNC: 28 MMOL/L (ref 21–32)
CREAT SERPL-MCNC: 0.78 MG/DL (ref 0.6–1.3)
EOSINOPHIL # BLD AUTO: 0.16 THOUSAND/ÂΜL (ref 0–0.61)
EOSINOPHIL NFR BLD AUTO: 3 % (ref 0–6)
ERYTHROCYTE [DISTWIDTH] IN BLOOD BY AUTOMATED COUNT: 14.7 % (ref 11.6–15.1)
GFR SERPL CREATININE-BSD FRML MDRD: 101 ML/MIN/1.73SQ M
GLUCOSE SERPL-MCNC: 67 MG/DL (ref 65–140)
HCT VFR BLD AUTO: 28.9 % (ref 36.5–49.3)
HGB BLD-MCNC: 10.3 G/DL (ref 12–17)
IMM GRANULOCYTES # BLD AUTO: 0.04 THOUSAND/UL (ref 0–0.2)
IMM GRANULOCYTES NFR BLD AUTO: 1 % (ref 0–2)
LYMPHOCYTES # BLD AUTO: 2.91 THOUSANDS/ÂΜL (ref 0.6–4.47)
LYMPHOCYTES NFR BLD AUTO: 45 % (ref 14–44)
MCH RBC QN AUTO: 39.8 PG (ref 26.8–34.3)
MCHC RBC AUTO-ENTMCNC: 35.6 G/DL (ref 31.4–37.4)
MCV RBC AUTO: 112 FL (ref 82–98)
MONOCYTES # BLD AUTO: 0.57 THOUSAND/ÂΜL (ref 0.17–1.22)
MONOCYTES NFR BLD AUTO: 9 % (ref 4–12)
NEUTROPHILS # BLD AUTO: 2.57 THOUSANDS/ÂΜL (ref 1.85–7.62)
NEUTS SEG NFR BLD AUTO: 41 % (ref 43–75)
NRBC BLD AUTO-RTO: 2 /100 WBCS
PLATELET # BLD AUTO: 312 THOUSANDS/UL (ref 149–390)
PMV BLD AUTO: 9.4 FL (ref 8.9–12.7)
POTASSIUM SERPL-SCNC: 5.1 MMOL/L (ref 3.5–5.3)
RBC # BLD AUTO: 2.59 MILLION/UL (ref 3.88–5.62)
SODIUM SERPL-SCNC: 143 MMOL/L (ref 135–147)
WBC # BLD AUTO: 6.3 THOUSAND/UL (ref 4.31–10.16)

## 2024-04-29 PROCEDURE — 87186 SC STD MICRODIL/AGAR DIL: CPT | Performed by: EMERGENCY MEDICINE

## 2024-04-29 PROCEDURE — 99284 EMERGENCY DEPT VISIT MOD MDM: CPT | Performed by: EMERGENCY MEDICINE

## 2024-04-29 PROCEDURE — 99283 EMERGENCY DEPT VISIT LOW MDM: CPT

## 2024-04-29 PROCEDURE — 87077 CULTURE AEROBIC IDENTIFY: CPT | Performed by: EMERGENCY MEDICINE

## 2024-04-29 PROCEDURE — 36415 COLL VENOUS BLD VENIPUNCTURE: CPT | Performed by: EMERGENCY MEDICINE

## 2024-04-29 PROCEDURE — 85025 COMPLETE CBC W/AUTO DIFF WBC: CPT | Performed by: EMERGENCY MEDICINE

## 2024-04-29 PROCEDURE — 96374 THER/PROPH/DIAG INJ IV PUSH: CPT

## 2024-04-29 PROCEDURE — 87205 SMEAR GRAM STAIN: CPT | Performed by: EMERGENCY MEDICINE

## 2024-04-29 PROCEDURE — 87070 CULTURE OTHR SPECIMN AEROBIC: CPT | Performed by: EMERGENCY MEDICINE

## 2024-04-29 PROCEDURE — 80048 BASIC METABOLIC PNL TOTAL CA: CPT | Performed by: EMERGENCY MEDICINE

## 2024-04-29 RX ORDER — ACETAMINOPHEN 325 MG/1
975 TABLET ORAL ONCE
Status: COMPLETED | OUTPATIENT
Start: 2024-04-29 | End: 2024-04-29

## 2024-04-29 RX ORDER — SULFAMETHOXAZOLE AND TRIMETHOPRIM 800; 160 MG/1; MG/1
1 TABLET ORAL ONCE
Status: COMPLETED | OUTPATIENT
Start: 2024-04-29 | End: 2024-04-29

## 2024-04-29 RX ORDER — LIDOCAINE HYDROCHLORIDE AND EPINEPHRINE 10; 10 MG/ML; UG/ML
10 INJECTION, SOLUTION INFILTRATION; PERINEURAL ONCE
Status: COMPLETED | OUTPATIENT
Start: 2024-04-29 | End: 2024-04-29

## 2024-04-29 RX ORDER — KETOROLAC TROMETHAMINE 30 MG/ML
15 INJECTION, SOLUTION INTRAMUSCULAR; INTRAVENOUS ONCE
Status: COMPLETED | OUTPATIENT
Start: 2024-04-29 | End: 2024-04-29

## 2024-04-29 RX ORDER — AMOXICILLIN AND CLAVULANATE POTASSIUM 875; 125 MG/1; MG/1
1 TABLET, FILM COATED ORAL EVERY 12 HOURS SCHEDULED
Qty: 14 TABLET | Refills: 0 | Status: SHIPPED | OUTPATIENT
Start: 2024-04-29 | End: 2024-05-06

## 2024-04-29 RX ADMIN — LIDOCAINE HYDROCHLORIDE,EPINEPHRINE BITARTRATE 10 ML: 10; .01 INJECTION, SOLUTION INFILTRATION; PERINEURAL at 02:07

## 2024-04-29 RX ADMIN — KETOROLAC TROMETHAMINE 15 MG: 30 INJECTION, SOLUTION INTRAMUSCULAR; INTRAVENOUS at 02:21

## 2024-04-29 RX ADMIN — ACETAMINOPHEN 975 MG: 325 TABLET ORAL at 02:04

## 2024-04-29 RX ADMIN — SULFAMETHOXAZOLE AND TRIMETHOPRIM 1 TABLET: 800; 160 TABLET ORAL at 02:04

## 2024-04-29 NOTE — ED PROVIDER NOTES
Final Diagnosis:  1. Perianal abscess        Chief Complaint   Patient presents with    Abscess     Pt reports seb anal abscess, has hx of same in past where it has had to be lanced. This most recent episode started about 3 days ago on L buttock. Pain 10/10       HPI  Patient with a history of perianal abscess presents with some swelling purulence around his anus.  Historically has had been drained.  Has been going for about 3 days left buttocks on exam has a perianal abscess.  Outside the dentate line.  Left leg 3-4 o'clock.  Pain is 10 out of 10.  It is already draining.  No fevers chills.  No systemic symptoms.  71 time prior.  No diabetes history. Not in pilonidal location.     EMS additionally reports:     - Previous charting underwent limited review with attention to last ED visits, labs, ekgs, and prior imaging.  Chart review reveals :     No results found for any previous visit.       - No language barrier.   - History obtained from patient    - Discuss patient's care, with patient permission or by chart review, with      PMH:   has a past medical history of Abscess and Esophageal foreign body.    PSH:   has a past surgical history that includes Abdominal surgery (2020) and Tracheal surgery.     Social History:  Tobacco Use: Medium Risk (4/29/2024)    Patient History     Smoking Tobacco Use: Former     Smokeless Tobacco Use: Never     Passive Exposure: Not on file     Alcohol Use: Not on file     No illicit use       ROS:  Pertinent positives/negatives: .     Some ROS may be present in the HPI and would take precedent over these standard questions asked below.   Review of Systems   Gastrointestinal:  Positive for anal bleeding.        CONSTITUTIONAL:  No lethargy. No unexpected weight loss. No change in behavior.  EYES:  No pain, redness, or discharge. No loss of vision. No orbital trauma or pain.   ENT:  No tinnitus or decreased hearing. No epistaxis/purulent rhinorrhea. No voice change, airway closing,  trismus.   CARDIOVASCULAR:  No chest pain. No skin mottling or pallor. No change in exertional capacity  RESPIRATORY:  No hemoptysis. No paroxysmal nocturnal dyspnea. No stridor. No apnea or bluing.   GASTROINTESTINAL:  No vomiting, diarrhea. No distension. No melena. No hematochezia.   GENITOURINARY:  No nocturia. No hematuria or foul smelling or cloudy urine. No discharge. No sores/adenopathy.   MUSCULOSKELETAL:  No contracture.  No new deformity.   INTEGUMENTARY:  No swelling. No unexpected contusions. No abrasions. No lymphangitis.  NEUROLOGIC:  No meningismus. No new numbness of the extremities. No new focal weakness. No postural instability  PSYCHIATRIC:  No SI HI AVH  HEMATOLOGICAL:  No bleeding. No petechiae. No bruising.  ALLERGIES:  No urticaria. No sudden abd cramping. No stridor.    PE:     Physical exam highlights:   Physical Exam       Vitals:    04/29/24 0149 04/29/24 0245 04/29/24 0315 04/29/24 0345   BP: 136/80 131/74 121/79 115/72   BP Location: Left arm Left arm Left arm    Pulse: 86 84 80 84   Resp: 20 18 16 16   Temp: 98.2 °F (36.8 °C)      SpO2: 95% 95% 96% 95%   Weight: 69.4 kg (153 lb)        Vitals reviewed by me.   Nursing note reviewed  Chaperone present for all sensitive exam.  Const: No acute distress. Alert. Nontoxic. Not diaphoretic.    HEENT: External ears normal. No protrusion drainage swelling. Nose normal. No drainage/traumatic deformity. MM. Mouth with baseline/symmetric movement. No trismus.   Eyes: No squinting. No icterus. No tearing/swelling/drainage. Tracks through the room with normal EOM.   Neck: ROM normal. No rigidity. No meningismus.  Cards: Rate as per vitals Compared to monitor sinus unless documented. Regular Well perfused.  Pulm: Effort and excursion normal. No distress. No audible wheezing/no stridor. Normal resp rate without retraction or change in work of breathing.  Abd: No distension beyond baseline. No fluctuant wave. Patient without peritoneal pain with  shifting/bumping the bed.   MSK: ROM normal baseline. No deformity. No contractures from baseline.   Skin: No new rashes visible. Well perfused. No wounds visualized on exposed skin  Neuro: Nonfocal. Baseline. CN grossly intact. Moving all four with coordination.   Psych: Normal behavior and affect.        A:  - Nursing note reviewed.    Ddx and MDM  Considered diagnoses    Patti anal around 3 oclock  Draining abscess  Purulent d/c    Tender    CT ab pelv  R/o fistulous tract  Patient wishes to leave prior to CT  Abx  Gen surg f/u  Encourage return / f/u         Dispo decision       My conversation with consultant reveals:        Decision rules:                           My read of the XR/CT scan reveals:     No orders to display       Orders Placed This Encounter   Procedures    Wound culture and Gram stain    CBC and differential    Basic metabolic panel    Ambulatory Referral to General Surgery     Labs Reviewed   CBC AND DIFFERENTIAL - Abnormal       Result Value Ref Range Status    WBC 6.30  4.31 - 10.16 Thousand/uL Final    RBC 2.59 (*) 3.88 - 5.62 Million/uL Final    Hemoglobin 10.3 (*) 12.0 - 17.0 g/dL Final    Hematocrit 28.9 (*) 36.5 - 49.3 % Final     (*) 82 - 98 fL Final    MCH 39.8 (*) 26.8 - 34.3 pg Final    MCHC 35.6  31.4 - 37.4 g/dL Final    RDW 14.7  11.6 - 15.1 % Final    MPV 9.4  8.9 - 12.7 fL Final    Platelets 312  149 - 390 Thousands/uL Final    nRBC 2  /100 WBCs Final    Segmented % 41 (*) 43 - 75 % Final    Immature Grans % 1  0 - 2 % Final    Lymphocytes % 45 (*) 14 - 44 % Final    Monocytes % 9  4 - 12 % Final    Eosinophils Relative 3  0 - 6 % Final    Basophils Relative 1  0 - 1 % Final    Absolute Neutrophils 2.57  1.85 - 7.62 Thousands/µL Final    Absolute Immature Grans 0.04  0.00 - 0.20 Thousand/uL Final    Absolute Lymphocytes 2.91  0.60 - 4.47 Thousands/µL Final    Absolute Monocytes 0.57  0.17 - 1.22 Thousand/µL Final    Eosinophils Absolute 0.16  0.00 - 0.61 Thousand/µL  Final    Basophils Absolute 0.05  0.00 - 0.10 Thousands/µL Final   BASIC METABOLIC PANEL - Abnormal    Sodium 143  135 - 147 mmol/L Final    Potassium 5.1  3.5 - 5.3 mmol/L Final    Comment: Slightly Hemolyzed:Results may be affected.    Chloride 111 (*) 96 - 108 mmol/L Final    CO2 28  21 - 32 mmol/L Final    ANION GAP 4  4 - 13 mmol/L Final    BUN 15  5 - 25 mg/dL Final    Creatinine 0.78  0.60 - 1.30 mg/dL Final    Comment: Standardized to IDMS reference method    Glucose 67  65 - 140 mg/dL Final    Comment: If the patient is fasting, the ADA then defines impaired fasting glucose as > 100 mg/dL and diabetes as > or equal to 123 mg/dL.    Calcium 8.2 (*) 8.4 - 10.2 mg/dL Final    eGFR 101  ml/min/1.73sq m Final    Narrative:     National Kidney Disease Foundation guidelines for Chronic Kidney Disease (CKD):     Stage 1 with normal or high GFR (GFR > 90 mL/min/1.73 square meters)    Stage 2 Mild CKD (GFR = 60-89 mL/min/1.73 square meters)    Stage 3A Moderate CKD (GFR = 45-59 mL/min/1.73 square meters)    Stage 3B Moderate CKD (GFR = 30-44 mL/min/1.73 square meters)    Stage 4 Severe CKD (GFR = 15-29 mL/min/1.73 square meters)    Stage 5 End Stage CKD (GFR <15 mL/min/1.73 square meters)  Note: GFR calculation is accurate only with a steady state creatinine       *Each of these labs was reviewed. Particular standout labs will be noted in the ED Course above     Final Diagnosis:  1. Perianal abscess          P:  - hospital tx includes   Medications   sulfamethoxazole-trimethoprim (BACTRIM DS) 800-160 mg per tablet 1 tablet (1 tablet Oral Given 4/29/24 0204)   lidocaine-epinephrine (XYLOCAINE/EPINEPHRINE) 1 %-1:100,000 injection 10 mL (10 mL Infiltration Given 4/29/24 0207)   acetaminophen (TYLENOL) tablet 975 mg (975 mg Oral Given 4/29/24 0204)   ketorolac (TORADOL) injection 15 mg (15 mg Intravenous Given 4/29/24 0221)         - disposition  Time reflects when diagnosis was documented in both MDM as applicable and  the Disposition within this note       Time User Action Codes Description Comment    4/29/2024  4:30 AM Fred Flores [K61.0] Perianal abscess           ED Disposition       ED Disposition   Discharge    Condition   Stable    Date/Time   Mon Apr 29, 2024  4:30 AM    Comment   Poncho Duterval discharge to home/self care.                   Follow-up Information       Follow up With Specialties Details Why Contact Info Additional Information    [facility] General Surgery   [address]  [facility] [address] [phone] [facility], [address], [place], [place], [address] [phone]            - patient will call their PCP to let them know they were in the emergency department. We discuss return precautions and patient is agreeable with plan and aformentioned disposition.       - additional treatment intended, if consistent with primary provider:  - patient to follow with :      Discharge Medication List as of 4/29/2024  4:35 AM        START taking these medications    Details   amoxicillin-clavulanate (AUGMENTIN) 875-125 mg per tablet Take 1 tablet by mouth every 12 (twelve) hours for 7 days, Starting Mon 4/29/2024, Until Mon 5/6/2024, Normal           CONTINUE these medications which have NOT CHANGED    Details   chlorhexidine (PERIDEX) 0.12 % solution Apply 15 mL to the mouth or throat 2 (two) times a day Do not use longer than 2 weeks, can stain teeth pink, Starting Wed 11/15/2023, Normal      naproxen (NAPROSYN) 500 mg tablet Take 1 tablet (500 mg total) by mouth 2 (two) times a day with meals for 7 days, Starting Sun 2/25/2024, Until Sun 3/3/2024, Normal             Prior to Admission Medications   Prescriptions Last Dose Informant Patient Reported? Taking?   chlorhexidine (PERIDEX) 0.12 % solution   No No   Sig: Apply 15 mL to the mouth or throat 2 (two) times a day Do not use longer than 2 weeks, can  "stain teeth pink   naproxen (NAPROSYN) 500 mg tablet   No No   Sig: Take 1 tablet (500 mg total) by mouth 2 (two) times a day with meals for 7 days      Facility-Administered Medications: None       Portions of the record may have been created with voice recognition software. Occasional wrong word or \"sound a like\" substitutions may have occurred due to the inherent limitations of voice recognition software. Read the chart carefully and recognize, using context, where substitutions have occurred.    Electronically signed by:  MD Fred Villalpando MD  05/03/24 5418    "

## 2024-05-01 LAB
BACTERIA WND AEROBE CULT: ABNORMAL
BACTERIA WND AEROBE CULT: ABNORMAL
GRAM STN SPEC: ABNORMAL
GRAM STN SPEC: ABNORMAL

## 2024-05-10 ENCOUNTER — CONSULT (OUTPATIENT)
Dept: SURGERY | Facility: CLINIC | Age: 54
End: 2024-05-10
Payer: COMMERCIAL

## 2024-05-10 VITALS
TEMPERATURE: 97.6 F | HEART RATE: 77 BPM | BODY MASS INDEX: 21.48 KG/M2 | DIASTOLIC BLOOD PRESSURE: 87 MMHG | SYSTOLIC BLOOD PRESSURE: 130 MMHG | WEIGHT: 153.4 LBS | HEIGHT: 71 IN | OXYGEN SATURATION: 96 %

## 2024-05-10 DIAGNOSIS — T88.4XXA DIFFICULT INTUBATION: ICD-10-CM

## 2024-05-10 DIAGNOSIS — K61.0 PERIANAL ABSCESS: Primary | ICD-10-CM

## 2024-05-10 DIAGNOSIS — L73.2 HIDRADENITIS SUPPURATIVA OF ANUS: ICD-10-CM

## 2024-05-10 PROBLEM — K61.1 PERIRECTAL ABSCESS: Status: ACTIVE | Noted: 2024-05-10

## 2024-05-10 PROCEDURE — 99204 OFFICE O/P NEW MOD 45 MIN: CPT | Performed by: SPECIALIST
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
## 2024-07-24 ENCOUNTER — OFFICE VISIT (OUTPATIENT)
Age: 54
End: 2024-07-24
Payer: COMMERCIAL

## 2024-07-24 ENCOUNTER — TELEPHONE (OUTPATIENT)
Age: 54
End: 2024-07-24

## 2024-07-24 VITALS — BODY MASS INDEX: 21.84 KG/M2 | WEIGHT: 156 LBS | HEIGHT: 71 IN

## 2024-07-24 DIAGNOSIS — Z12.11 ENCOUNTER FOR SCREENING COLONOSCOPY: Primary | ICD-10-CM

## 2024-07-24 DIAGNOSIS — L73.2 HIDRADENITIS SUPPURATIVA OF ANUS: ICD-10-CM

## 2024-07-24 DIAGNOSIS — K61.0 PERIANAL ABSCESS: ICD-10-CM

## 2024-07-24 PROCEDURE — 99204 OFFICE O/P NEW MOD 45 MIN: CPT | Performed by: COLON & RECTAL SURGERY

## 2024-07-24 NOTE — TELEPHONE ENCOUNTER
Erica from [facility] called in to ask if patient was still at the office. Erica was helping Patient's wife located pt.

## 2024-07-24 NOTE — ASSESSMENT & PLAN NOTE
Colonoscopy risks, not limited to bleeding, perforated colon, need for surgery, and missed lesions were discussed. Alternatives were discussed. Questions were answered. He agreed to the procedure.

## 2024-07-24 NOTE — ASSESSMENT & PLAN NOTE
The patient has diffuse perianal hidradenitis with multiple draining fistula openings.  Extensive examination in the operating room with unroofing of the capsules or fistulous is recommended.  I explained that incontinence of stool if an anal fistulotomy is done, recurrent disease, infection, bleeding, or persisting disease is possible.  I explained my goal of unroofing the areas of disease to help with symptom control.  I explained that this is a chronic disease and may persist or recur.  He understands.  He agrees to the operation.

## 2024-08-02 ENCOUNTER — TELEPHONE (OUTPATIENT)
Age: 54
End: 2024-08-02

## 2024-08-02 NOTE — LETTER
Poncho Gonzalez  [address] [place] [id number]        Dear Poncho Gonzalez,    We attempted to reach you in regards to scheduling surgery, and unfortunately we were unable to contact you.      Please call our office at [phone] at your convenience in regards to this matter.        Thank you,        [facility]  [address] [place] [id number]  [phone]

## 2024-08-02 NOTE — TELEPHONE ENCOUNTER
Attempted to contact patient to schedule colonoscopy and surgery, no answer. Unable to LVM. Will attempt at a later date.       ----- Message from OSCAR Norwood MD sent at 7/24/2024  2:27 PM EDT -----  OR, separate colonoscopy

## 2024-08-09 NOTE — TELEPHONE ENCOUNTER
2ND ATTEMPT contact patient to schedule colonoscopy and surgery, no answer. Unable to LVM. Mailed letter

## 2024-10-19 ENCOUNTER — HOSPITAL ENCOUNTER (EMERGENCY)
Facility: HOSPITAL | Age: 54
Discharge: HOME | End: 2024-10-19
Attending: EMERGENCY MEDICINE
Payer: COMMERCIAL

## 2024-10-19 VITALS
RESPIRATION RATE: 18 BRPM | DIASTOLIC BLOOD PRESSURE: 89 MMHG | TEMPERATURE: 97.6 F | SYSTOLIC BLOOD PRESSURE: 141 MMHG | HEART RATE: 59 BPM | OXYGEN SATURATION: 100 %

## 2024-10-19 DIAGNOSIS — K61.0 PERIANAL ABSCESS: Primary | ICD-10-CM

## 2024-10-19 PROCEDURE — 96372 THER/PROPH/DIAG INJ SC/IM: CPT

## 2024-10-19 PROCEDURE — 99284 EMERGENCY DEPT VISIT MOD MDM: CPT | Performed by: EMERGENCY MEDICINE

## 2024-10-19 PROCEDURE — 99282 EMERGENCY DEPT VISIT SF MDM: CPT

## 2024-10-19 RX ORDER — KETOROLAC TROMETHAMINE 30 MG/ML
15 INJECTION, SOLUTION INTRAMUSCULAR; INTRAVENOUS ONCE
Status: COMPLETED | OUTPATIENT
Start: 2024-10-19 | End: 2024-10-19

## 2024-10-19 RX ORDER — NAPROXEN 500 MG/1
500 TABLET ORAL 2 TIMES DAILY WITH MEALS
Qty: 30 TABLET | Refills: 0 | Status: SHIPPED | OUTPATIENT
Start: 2024-10-19

## 2024-10-19 RX ORDER — ACETAMINOPHEN 325 MG/1
975 TABLET ORAL ONCE
Status: COMPLETED | OUTPATIENT
Start: 2024-10-19 | End: 2024-10-19

## 2024-10-19 RX ORDER — ACETAMINOPHEN 500 MG
1000 TABLET ORAL EVERY 8 HOURS PRN
Qty: 55 TABLET | Refills: 0 | Status: SHIPPED | OUTPATIENT
Start: 2024-10-19

## 2024-10-19 RX ADMIN — ACETAMINOPHEN 975 MG: 325 TABLET ORAL at 04:15

## 2024-10-19 RX ADMIN — KETOROLAC TROMETHAMINE 15 MG: 30 INJECTION, SOLUTION INTRAMUSCULAR at 04:14

## 2024-10-19 RX ADMIN — AMOXICILLIN AND CLAVULANATE POTASSIUM 1 TABLET: 875; 125 TABLET, COATED ORAL at 04:15

## 2024-10-19 NOTE — ED PROVIDER NOTES
Final Diagnosis:  1. Perianal abscess        Chief Complaint   Patient presents with    Abscess     Patient c/o perianal abscess w/ drainage & 10/10 pain       HPI  Pt pres w/ purulent d/c from anus.     Seen multiple times for similar. 10/10 pain subjective, he's sleeping when I come into room.    About a week of symptoms.    I saw him in er last  Augmentin cleared it up  Saw gen surg      Rec colonoscopy and colorectal  Saw colorectal      Rec EUA and colonoscopy    Chart says they couldn't get ahold of pt w/ mutiple calls and mailed letter to schedule  That was 6 mo ago    No systemic symptoms like fever chills.     EMS additionally reports:     - Previous charting underwent limited review with attention to last ED visits, labs, ekgs, and prior imaging.  Chart review reveals :     Admission on 04/29/2024, Discharged on 04/29/2024   Component Date Value Ref Range Status    Wound Culture 04/29/2024 2+ Growth of Escherichia coli (A)   Final    This organism has been edited. The previous result was Gram Negative N[facility]af Enteric Like on 4/30/2024 at 1100 EDT.    Wound Culture 04/29/2024 1+ Growth of   Final    Mixed Skin Tamera    Gram Stain Result 04/29/2024 No polys seen (A)   Final    Gram Stain Result 04/29/2024 1+ Gram negative rods (A)   Final    WBC 04/29/2024 6.30  4.31 - 10.16 Thousand/uL Final    RBC 04/29/2024 2.59 (L)  3.88 - 5.62 Million/uL Final    Hemoglobin 04/29/2024 10.3 (L)  12.0 - 17.0 g/dL Final    Hematocrit 04/29/2024 28.9 (L)  36.5 - 49.3 % Final    MCV 04/29/2024 112 (H)  82 - 98 fL Final    MCH 04/29/2024 39.8 (H)  26.8 - 34.3 pg Final    MCHC 04/29/2024 35.6  31.4 - 37.4 g/dL Final    RDW 04/29/2024 14.7  11.6 - 15.1 % Final    MPV 04/29/2024 9.4  8.9 - 12.7 fL Final    Platelets 04/29/2024 312  149 - 390 Thousands/uL Final    nRBC 04/29/2024 2  /100 WBCs Final    Segmented % 04/29/2024 41 (L)  43 - 75 % Final    Immature Grans % 04/29/2024 1  0 - 2 % Final    Lymphocytes % 04/29/2024 45 (H)  14  - 44 % Final    Monocytes % 04/29/2024 9  4 - 12 % Final    Eosinophils Relative 04/29/2024 3  0 - 6 % Final    Basophils Relative 04/29/2024 1  0 - 1 % Final    Absolute Neutrophils 04/29/2024 2.57  1.85 - 7.62 Thousands/µL Final    Absolute Immature Grans 04/29/2024 0.04  0.00 - 0.20 Thousand/uL Final    Absolute Lymphocytes 04/29/2024 2.91  0.60 - 4.47 Thousands/µL Final    Absolute Monocytes 04/29/2024 0.57  0.17 - 1.22 Thousand/µL Final    Eosinophils Absolute 04/29/2024 0.16  0.00 - 0.61 Thousand/µL Final    Basophils Absolute 04/29/2024 0.05  0.00 - 0.10 Thousands/µL Final    Sodium 04/29/2024 143  135 - 147 mmol/L Final    Potassium 04/29/2024 5.1  3.5 - 5.3 mmol/L Final    Slightly Hemolyzed:Results may be affected.    Chloride 04/29/2024 111 (H)  96 - 108 mmol/L Final    CO2 04/29/2024 28  21 - 32 mmol/L Final    ANION GAP 04/29/2024 4  4 - 13 mmol/L Final    BUN 04/29/2024 15  5 - 25 mg/dL Final    Creatinine 04/29/2024 0.78  0.60 - 1.30 mg/dL Final    Standardized to IDMS reference method    Glucose 04/29/2024 67  65 - 140 mg/dL Final    If the patient is fasting, the ADA then defines impaired fasting glucose as > 100 mg/dL and diabetes as > or equal to 123 mg/dL.    Calcium 04/29/2024 8.2 (L)  8.4 - 10.2 mg/dL Final    eGFR 04/29/2024 101  ml/min/1.73sq m Final       - No language barrier.   - History obtained from patient    - Discuss patient's care, with patient permission or by chart review, with      PMH:   has a past medical history of Abscess and Esophageal foreign body.    PSH:   has a past surgical history that includes Abdominal surgery (2020) and Tracheal surgery.     Social History:  Tobacco Use: Medium Risk (10/19/2024)    Patient History     Smoking Tobacco Use: Former     Smokeless Tobacco Use: Never     Passive Exposure: Not on file     Alcohol Use: Not on file     No illicit use       ROS:  Pertinent positives/negatives: .     Some ROS may be present in the HPI and would take precedent  over these standard questions asked below.   Review of Systems   Constitutional:  Negative for chills and fever.   Gastrointestinal:  Positive for anal bleeding and rectal pain. Negative for abdominal pain, nausea and vomiting.        CONSTITUTIONAL:  No lethargy. No unexpected weight loss. No change in behavior.  EYES:  No pain, redness, or discharge. No loss of vision. No orbital trauma or pain.   ENT:  No tinnitus or decreased hearing. No epistaxis/purulent rhinorrhea. No voice change, airway closing, trismus.   CARDIOVASCULAR:  No chest pain. No skin mottling or pallor. No change in exertional capacity  RESPIRATORY:  No hemoptysis. No paroxysmal nocturnal dyspnea. No stridor. No apnea or bluing.   GASTROINTESTINAL:  No vomiting, diarrhea. No distension. No melena. No hematochezia.   GENITOURINARY:  No nocturia. No hematuria or foul smelling or cloudy urine. No discharge. No sores/adenopathy.   MUSCULOSKELETAL:  No contracture.  No new deformity.   INTEGUMENTARY:  No swelling. No unexpected contusions. No abrasions. No lymphangitis.  NEUROLOGIC:  No meningismus. No new numbness of the extremities. No new focal weakness. No postural instability  PSYCHIATRIC:  No SI HI AVH  HEMATOLOGICAL:  No bleeding. No petechiae. No bruising.  ALLERGIES:  No urticaria. No sudden abd cramping. No stridor.    PE:     Physical exam highlights:   Physical Exam       Vitals:    10/19/24 0317   BP: 141/89   Pulse: 59   Resp: 18   Temp: 97.6 °F (36.4 °C)   TempSrc: Oral   SpO2: 100%     Vitals reviewed by me.   Nursing note reviewed  Chaperone present for all sensitive exam.  Const: No acute distress. Alert. Nontoxic. Not diaphoretic.    HEENT: External ears normal. No protrusion drainage swelling. Nose normal. No drainage/traumatic deformity. MM. Mouth with baseline/symmetric movement. No trismus.   Eyes: No squinting. No icterus. No tearing/swelling/drainage. Tracks through the room with normal EOM.   Neck: ROM normal. No rigidity.  No meningismus.  Cards: Rate as per vitals Compared to monitor sinus unless documented. Regular Well perfused.  Pulm: Effort and excursion normal. No distress. No audible wheezing/no stridor. Normal resp rate without retraction or change in work of breathing.  Abd: No distension beyond baseline. No fluctuant wave. Patient without peritoneal pain with shifting/bumping the bed.   MSK: ROM normal baseline. No deformity. No contractures from baseline.   Skin: No new rashes visible. Well perfused. No wounds visualized on exposed skin  Neuro: Nonfocal. Baseline. CN grossly intact. Moving all four with coordination.   Psych: Normal behavior and affect.    Chaperoned exam, SD RN  Multiple areas of likely fistula.   No rectal involvement  Perianal draining abscess  Some tenderness.   No surrounding warmth/erythema  Purulent d/c from left 7 oclock fistula/abscess    A:  - Nursing note reviewed.    Ddx and MDM  Considered diagnoses    Perianal abscess  Enteric cov - augmentin  Pain control    Topical control for discomfort when stooling, nifedipine/lido    Colorectal referral placed again.         Dispo decision       My conversation with consultant reveals:        Decision rules:                           My read of the XR/CT scan reveals:     No orders to display       Orders Placed This Encounter   Procedures    Ambulatory Referral to Colorectal Surgery     Labs Reviewed - No data to display    *Each of these labs was reviewed. Particular standout labs will be noted in the ED Course above     Final Diagnosis:  1. Perianal abscess          P:  - hospital tx includes   Medications   amoxicillin-clavulanate (AUGMENTIN) 875-125 mg per tablet 1 tablet (1 tablet Oral Given 10/19/24 0415)   ketorolac (TORADOL) injection 15 mg (15 mg Intramuscular Given 10/19/24 0414)   acetaminophen (TYLENOL) tablet 975 mg (975 mg Oral Given 10/19/24 0415)         - disposition  Time reflects when diagnosis was documented in both MDM as applicable  and the Disposition within this note       Time User Action Codes Description Comment    10/19/2024  4:18 AM Fred Flores Add [K61.0] Perianal abscess           ED Disposition       ED Disposition   Discharge    Condition   Stable    Date/Time   Sat Oct 19, 2024  4:17 AM    Comment   Poncho Duterval discharge to home/self care.                   Follow-up Information       Follow up With Specialties Details Why Contact Info    [facility] Colon and Rectal Surgery   [address] [address] [address] [phone]            - patient will call their PCP to let them know they were in the emergency department. We discuss return precautions and patient is agreeable with plan and aformentioned disposition.       - additional treatment intended, if consistent with primary provider:  - patient to follow with :      Discharge Medication List as of 10/19/2024  4:22 AM        START taking these medications    Details   acetaminophen (TYLENOL) 500 mg tablet Take 2 tablets (1,000 mg total) by mouth every 8 (eight) hours as needed for mild pain, Starting Sat 10/19/2024, Normal      amoxicillin-clavulanate (AUGMENTIN) 875-125 mg per tablet Take 1 tablet by mouth every 12 (twelve) hours for 10 days, Starting Sat 10/19/2024, Until Tue 10/29/2024, Normal      NIFEdipine 0.3%-lidocaine 5% rectal ointment Apply 1 Application topically every 6 (six) hours as needed for discomfort or pain Apply a small amount to anal fissure, Starting Sat 10/19/2024, Normal           CONTINUE these medications which have CHANGED    Details   naproxen (Naprosyn) 500 mg tablet Take 1 tablet (500 mg total) by mouth 2 (two) times a day with meals, Starting Sat 10/19/2024, Normal           CONTINUE these medications which have NOT CHANGED    Details   chlorhexidine (PERIDEX) 0.12 % solution Apply 15 mL to the mouth or throat 2 (two) times a day Do not use longer than 2 weeks, can stain teeth pink, Starting Wed  "11/15/2023, Normal             Prior to Admission Medications   Prescriptions Last Dose Informant Patient Reported? Taking?   chlorhexidine (PERIDEX) 0.12 % solution   No No   Sig: Apply 15 mL to the mouth or throat 2 (two) times a day Do not use longer than 2 weeks, can stain teeth pink   Patient not taking: Reported on 5/10/2024   naproxen (NAPROSYN) 500 mg tablet   No No   Sig: Take 1 tablet (500 mg total) by mouth 2 (two) times a day with meals for 7 days      Facility-Administered Medications: None       Portions of the record may have been created with voice recognition software. Occasional wrong word or \"sound a like\" substitutions may have occurred due to the inherent limitations of voice recognition software. Read the chart carefully and recognize, using context, where substitutions have occurred.    Electronically signed by:  MD Fred Villalpando MD  10/19/24 0446    "

## 2024-11-07 ENCOUNTER — APPOINTMENT (EMERGENCY)
Dept: RADIOLOGY | Facility: HOSPITAL | Age: 54
End: 2024-11-07
Payer: COMMERCIAL

## 2024-11-07 ENCOUNTER — HOSPITAL ENCOUNTER (EMERGENCY)
Facility: HOSPITAL | Age: 54
Discharge: HOME | End: 2024-11-08
Attending: EMERGENCY MEDICINE
Payer: COMMERCIAL

## 2024-11-07 VITALS
SYSTOLIC BLOOD PRESSURE: 142 MMHG | BODY MASS INDEX: 21.59 KG/M2 | OXYGEN SATURATION: 97 % | DIASTOLIC BLOOD PRESSURE: 94 MMHG | TEMPERATURE: 96.9 F | RESPIRATION RATE: 20 BRPM | HEART RATE: 70 BPM | WEIGHT: 154.8 LBS

## 2024-11-07 DIAGNOSIS — R10.9 ABDOMINAL PAIN: ICD-10-CM

## 2024-11-07 DIAGNOSIS — L02.31 ABSCESS, GLUTEAL, RIGHT: ICD-10-CM

## 2024-11-07 DIAGNOSIS — L02.31 ABSCESS, GLUTEAL, LEFT: Primary | ICD-10-CM

## 2024-11-07 DIAGNOSIS — K57.90 DIVERTICULOSIS: ICD-10-CM

## 2024-11-07 LAB
ALBUMIN SERPL BCG-MCNC: 3.2 G/DL (ref 3.5–5)
ALP SERPL-CCNC: 69 U/L (ref 34–104)
ALT SERPL W P-5'-P-CCNC: 18 U/L (ref 7–52)
ANION GAP SERPL CALCULATED.3IONS-SCNC: 5 MMOL/L (ref 4–13)
AST SERPL W P-5'-P-CCNC: 23 U/L (ref 13–39)
BASOPHILS # BLD AUTO: 0.05 THOUSANDS/ÂΜL (ref 0–0.1)
BASOPHILS NFR BLD AUTO: 1 % (ref 0–1)
BILIRUB SERPL-MCNC: 0.29 MG/DL (ref 0.2–1)
BUN SERPL-MCNC: 19 MG/DL (ref 5–25)
CALCIUM ALBUM COR SERPL-MCNC: 8.9 MG/DL (ref 8.3–10.1)
CALCIUM SERPL-MCNC: 8.3 MG/DL (ref 8.4–10.2)
CHLORIDE SERPL-SCNC: 110 MMOL/L (ref 96–108)
CO2 SERPL-SCNC: 29 MMOL/L (ref 21–32)
CREAT SERPL-MCNC: 0.9 MG/DL (ref 0.6–1.3)
EOSINOPHIL # BLD AUTO: 0.05 THOUSAND/ÂΜL (ref 0–0.61)
EOSINOPHIL NFR BLD AUTO: 1 % (ref 0–6)
ERYTHROCYTE [DISTWIDTH] IN BLOOD BY AUTOMATED COUNT: 16.6 % (ref 11.6–15.1)
GFR SERPL CREATININE-BSD FRML MDRD: 95 ML/MIN/1.73SQ M
GLUCOSE SERPL-MCNC: 118 MG/DL (ref 65–140)
HCT VFR BLD AUTO: 30.4 % (ref 36.5–49.3)
HGB BLD-MCNC: 10.9 G/DL (ref 12–17)
IMM GRANULOCYTES # BLD AUTO: 0.02 THOUSAND/UL (ref 0–0.2)
IMM GRANULOCYTES NFR BLD AUTO: 0 % (ref 0–2)
LIPASE SERPL-CCNC: 10 U/L (ref 11–82)
LYMPHOCYTES # BLD AUTO: 1.82 THOUSANDS/ÂΜL (ref 0.6–4.47)
LYMPHOCYTES NFR BLD AUTO: 33 % (ref 14–44)
MCH RBC QN AUTO: 36.9 PG (ref 26.8–34.3)
MCHC RBC AUTO-ENTMCNC: 35.9 G/DL (ref 31.4–37.4)
MCV RBC AUTO: 103 FL (ref 82–98)
MONOCYTES # BLD AUTO: 0.49 THOUSAND/ÂΜL (ref 0.17–1.22)
MONOCYTES NFR BLD AUTO: 9 % (ref 4–12)
NEUTROPHILS # BLD AUTO: 3.13 THOUSANDS/ÂΜL (ref 1.85–7.62)
NEUTS SEG NFR BLD AUTO: 56 % (ref 43–75)
NRBC BLD AUTO-RTO: 1 /100 WBCS
PLATELET # BLD AUTO: 332 THOUSANDS/UL (ref 149–390)
PMV BLD AUTO: 8.8 FL (ref 8.9–12.7)
POTASSIUM SERPL-SCNC: 4.5 MMOL/L (ref 3.5–5.3)
PROT SERPL-MCNC: 6.5 G/DL (ref 6.4–8.4)
RBC # BLD AUTO: 2.95 MILLION/UL (ref 3.88–5.62)
SODIUM SERPL-SCNC: 144 MMOL/L (ref 135–147)
WBC # BLD AUTO: 5.56 THOUSAND/UL (ref 4.31–10.16)

## 2024-11-07 PROCEDURE — 85025 COMPLETE CBC W/AUTO DIFF WBC: CPT | Performed by: EMERGENCY MEDICINE

## 2024-11-07 PROCEDURE — 99285 EMERGENCY DEPT VISIT HI MDM: CPT | Performed by: EMERGENCY MEDICINE

## 2024-11-07 PROCEDURE — 99284 EMERGENCY DEPT VISIT MOD MDM: CPT

## 2024-11-07 PROCEDURE — 36415 COLL VENOUS BLD VENIPUNCTURE: CPT | Performed by: EMERGENCY MEDICINE

## 2024-11-07 PROCEDURE — 96375 TX/PRO/DX INJ NEW DRUG ADDON: CPT

## 2024-11-07 PROCEDURE — 96374 THER/PROPH/DIAG INJ IV PUSH: CPT

## 2024-11-07 PROCEDURE — 83690 ASSAY OF LIPASE: CPT | Performed by: EMERGENCY MEDICINE

## 2024-11-07 PROCEDURE — 74177 CT ABD & PELVIS W/CONTRAST: CPT

## 2024-11-07 PROCEDURE — 10060 I&D ABSCESS SIMPLE/SINGLE: CPT | Performed by: EMERGENCY MEDICINE

## 2024-11-07 PROCEDURE — 80053 COMPREHEN METABOLIC PANEL: CPT | Performed by: EMERGENCY MEDICINE

## 2024-11-07 RX ORDER — CLINDAMYCIN HYDROCHLORIDE 150 MG/1
300 CAPSULE ORAL ONCE
Status: DISCONTINUED | OUTPATIENT
Start: 2024-11-08 | End: 2024-11-07

## 2024-11-07 RX ORDER — LIDOCAINE HYDROCHLORIDE 10 MG/ML
10 INJECTION, SOLUTION EPIDURAL; INFILTRATION; INTRACAUDAL; PERINEURAL ONCE
Status: COMPLETED | OUTPATIENT
Start: 2024-11-07 | End: 2024-11-07

## 2024-11-07 RX ORDER — KETOROLAC TROMETHAMINE 30 MG/ML
15 INJECTION, SOLUTION INTRAMUSCULAR; INTRAVENOUS ONCE
Status: COMPLETED | OUTPATIENT
Start: 2024-11-07 | End: 2024-11-07

## 2024-11-07 RX ORDER — MORPHINE SULFATE 4 MG/ML
4 INJECTION, SOLUTION INTRAMUSCULAR; INTRAVENOUS ONCE
Status: COMPLETED | OUTPATIENT
Start: 2024-11-07 | End: 2024-11-07

## 2024-11-07 RX ORDER — CLINDAMYCIN PALMITATE HYDROCHLORIDE 75 MG/5ML
300 SOLUTION ORAL 3 TIMES DAILY
Qty: 600 ML | Refills: 0 | Status: SHIPPED | OUTPATIENT
Start: 2024-11-07 | End: 2024-11-17

## 2024-11-07 RX ORDER — CLINDAMYCIN HYDROCHLORIDE 300 MG/1
300 CAPSULE ORAL 4 TIMES DAILY
Qty: 40 CAPSULE | Refills: 0 | Status: SHIPPED | OUTPATIENT
Start: 2024-11-07 | End: 2024-11-07 | Stop reason: ALTCHOICE

## 2024-11-07 RX ORDER — CLINDAMYCIN PALMITATE HYDROCHLORIDE 75 MG/5ML
300 SOLUTION ORAL EVERY 6 HOURS SCHEDULED
Status: DISCONTINUED | OUTPATIENT
Start: 2024-11-08 | End: 2024-11-08

## 2024-11-07 RX ADMIN — LIDOCAINE HYDROCHLORIDE 10 ML: 10 INJECTION, SOLUTION EPIDURAL; INFILTRATION; INTRACAUDAL; PERINEURAL at 23:35

## 2024-11-07 RX ADMIN — KETOROLAC TROMETHAMINE 15 MG: 30 INJECTION, SOLUTION INTRAMUSCULAR at 20:29

## 2024-11-07 RX ADMIN — MORPHINE SULFATE 4 MG: 4 INJECTION INTRAVENOUS at 21:35

## 2024-11-07 RX ADMIN — IOHEXOL 100 ML: 350 INJECTION, SOLUTION INTRAVENOUS at 21:31

## 2024-11-08 RX ORDER — CLINDAMYCIN HYDROCHLORIDE 150 MG/1
300 CAPSULE ORAL ONCE
Status: COMPLETED | OUTPATIENT
Start: 2024-11-08 | End: 2024-11-08

## 2024-11-08 RX ADMIN — CLINDAMYCIN HYDROCHLORIDE 300 MG: 150 CAPSULE ORAL at 00:07
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
## 2024-11-08 NOTE — DISCHARGE INSTRUCTIONS
Return to the ER for further concerns or worsening symptoms  Follow up with your primary care physician and General surgeon in 1-2 days  Take medication as prescribed

## 2025-01-23 ENCOUNTER — TELEPHONE (OUTPATIENT)
Age: 55
End: 2025-01-23

## 2025-01-23 ENCOUNTER — OFFICE VISIT (OUTPATIENT)
Age: 55
End: 2025-01-23
Payer: COMMERCIAL

## 2025-01-23 ENCOUNTER — PREP FOR PROCEDURE (OUTPATIENT)
Age: 55
End: 2025-01-23

## 2025-01-23 VITALS — WEIGHT: 154 LBS | HEIGHT: 71 IN | BODY MASS INDEX: 21.56 KG/M2

## 2025-01-23 DIAGNOSIS — K61.0 PERIANAL ABSCESS: ICD-10-CM

## 2025-01-23 DIAGNOSIS — Z12.11 ENCOUNTER FOR SCREENING COLONOSCOPY: Primary | ICD-10-CM

## 2025-01-23 DIAGNOSIS — L73.2 HIDRADENITIS: Primary | ICD-10-CM

## 2025-01-23 DIAGNOSIS — L73.2 HIDRADENITIS SUPPURATIVA OF ANUS: ICD-10-CM

## 2025-01-23 PROCEDURE — 99215 OFFICE O/P EST HI 40 MIN: CPT | Performed by: COLON & RECTAL SURGERY
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
## 2025-01-23 NOTE — ASSESSMENT & PLAN NOTE
From the July 2024 visit:    Assessment: Poncho Gonzalez is a 53 y.o. male who has hidradenitis suppurativa and possible anal fistula.  He also needs colon screening.        Plan:   Hidradenitis suppurativa of anus  The patient has diffuse perianal hidradenitis with multiple draining fistula openings.  Extensive examination in the operating room with unroofing of the capsules or fistulous is recommended.  I explained that incontinence of stool if an anal fistulotomy is done, recurrent disease, infection, bleeding, or persisting disease is possible.  I explained my goal of unroofing the areas of disease to help with symptom control.  I explained that this is a chronic disease and may persist or recur.  He understands.  He agrees to the operation.     Encounter for screening colonoscopy  Colonoscopy risks, not limited to bleeding, perforated colon, need for surgery, and missed lesions were discussed. Alternatives were discussed. Questions were answered. He agreed to the procedure.       He failed to follow.  He now complains of drainage from his perineum.  He has persistent fistulized skin with drainage of exudative fluid.  Surgery will be needed to unroofed the hidradenitis sinuses beneath the skin.  I explained, carefully, to him and his wife that unroofing of the sinuses will be needed.  Possible removal of patches of skin and subcutaneous tissue may be needed.  Risks of bleeding, pain, infection, and recurrence were discussed.    Colonoscopy is also recommended as a screening test.  Colonoscopy risks, not limited to bleeding, perforated colon, need for surgery, and missed lesions were discussed. Alternatives were discussed. Questions were answered. He agreed to the procedure.

## 2025-01-23 NOTE — LETTER
[address] [address]        Location:  [place] [address]    Performing Physician: ALIN Norwood MD      DATE OF PROCEDURE: 4/30/2025 TIME OF PROCEDURE:                                 The OR/GI Lab will contact you the evening prior to your procedure with your exact arrival time.    We kindly ask that you immediately notify us of any need to cancel or reschedule your procedure.      WEEK BEFORE THE PROCEDURE:  Do not take Iron tablets for one week  5 days prior to the appointment AVOID vegetables and fruits with skins or seeds, nuts, corn, popcorn, and whole grain breads.  Purchase: One (1) 238-gram container of Miralax (polyethylene glycol 3350), four (4) 5 mg Dulcolax (bisacodyl) tablets, and 64-ounces of Gatorade (sports drink) - no red, orange, or purple. These may be purchased at any pharmacy without a prescription. Generic products are permissible.  Arrange responsible transportation for day of the procedure.      DAY BEFORE THE PROCEDURE:  CLEAR liquids only for entire day prior. Nothing red, orange or purple.  You MAY have:  Soda  Water  Broth Gatorade  Jell-O  Popsicles Coffee/tea without  Milk/creamer     YOU MAY NOT HAVE:  Solid foods  Milk and milk products  Juice with pulp          BOWEL PREPARATION: Includes: One (1) 238-gram container of Miralax (polyethylene glycol 3350), four (4) 5 mg Dulcolax (bisacodyl) tablets, and 64-ounces of Gatorade (sports drink). Preparation may be refrigerated. Entire bowel prep should be completed.    Afternoon before the procedure (2:00 pm - 5:00 pm):  Take two (2) 5 mg Dulcolax laxative tablets.    Evening before the procedure (6:00 pm):  Mix entire container of Miralax with 64-ounces of Gatorade and shake until all medication is dissolved.  Begin drinking solution. Drink an eight (8) ounce cup every 10-15 minutes until you have consumed half (32 ounces) of the solution. Refrigerate  remaining solution.    Night before the procedure (8:00 pm):  Take two (2) 5 mg Dulcolax laxative tablets.    Beginning 5 hours before your procedure:  Drink the remaining amount of prepared solution (32 ounces). Drink an eight (8) ounce cup every 10-15 minutes until you have consumed the remaining solution.      Bowel prep should be completed 4 hours prior to procedure time.  NOTHING TO EAT OR DRINK AFTER MIDNIGHT -EXCEPT YOUR BOWEL PREP                                                                                                                                                                                DAY OF THE PROCEDURE:  You may brush your teeth.  Leave all jewelry at home. Take out any facial piercings, if able.  Please arrive for your procedure as indicated by the OR / GI Lab / Endoscopy Unit. The hospital will contact you the day before with your exact arrival time.  Make sure you have arranged ahead of time for a responsible adult (18 or older) to accompany and drive you home after the procedure. Please discuss any transportation concerns with our staff prior to your procedure.  The effects of the anesthesia can persist for 24 hours. After receiving the sedation, you must exercise caution before engaging in any activity that could harm yourself and others (such as driving a car). Do not make any important decisions or do not drink any alcoholic beverages during this time period.  After your procedure, you may have anything you’d like to eat or drink. You will probably want to start with something light. Please include plenty of fluids. Avoid items that cause gas such as sodas and salads.      SPECIAL INSTRUCTIONS:    For patients currently taking blood thinners and/or antiplatelet therapy our office will contact the prescribing provider. Our office will contact you with any required changes to your medication regimen.  Blood thinner (i.e. - Coumadin, Pradaxa, Lovenox, Xarelto,  Eliquis)  N/A  Antiplatelet (i.e. - Plavix, Aggrenox, Effient, Brilinta)  N/A          Diabetes:  If you are Diabetic, please see separate Diabetic Instruction Sheet.  Prescribed medications:  Do not stop your aspirin, or any of your other medications (unless instructed otherwise).  Take the rest of your prescribed medications with small sips of water at least 2 hours prior to your procedure.      NOTHING TO EAT OR DRINK (INCLUDING CHEWING GUM) AFTER 12 MIDNIGHT PRIOR TO YOUR PROCEDURE EXCEPT YOUR BOWEL PREP.        For any questions or concerns related to your bowel preparation or pre-procedure instructions, please contact our office.  Thank you for choosing [facility]’s Colon & Rectal Surgery!    Revised 1/2023    [phone]

## 2025-01-23 NOTE — LETTER
Poncho Gonzalez  [address] [place] [id number]      1/23/2025    Dear Poncho Lisa,    Your surgery is scheduled for: 2/7/2025  The hospital will call the evening prior to your surgery with your expected arrival time.   Location:[facility] ([facility] [address] [facility] [address] [facility] [address]    CHECK LIST PRIOR TO OUTPATIENT SURGERY  It is your responsibility to obtain any/all referrals needed for your surgery if required by your insurance. Our office will contact you to discuss your insurance coverage for this procedure.     Special instructions required if you are taking any blood thinners. Please verify with the prescribing physician. Examples include Coumadin, Plavix, Xarelto, Eliquis, Pradaxa, etc.     Please check with your family physician if you are taking the following medications: Aspirin or any Aspirin containing medication, Gingko biloba, Ginseng, Feverfew, and/or Belinda’s Wort. We suggest stopping these for 3 days.     The night before and the day of your surgery, wash from your neck to groin with chlorhexidine soap. This soap is available at most retail pharmacies under such brand names as Hibiclens, Endure or Aplicare.     Pre-admission testing Required: YES      NO X     Other Clearances/ Additional Testing: NONE       NOTHING TO EAT OR DRINK AFTER MIDNIGHT PRIOR TO SURGERY.     Take ONE FLEET ENEMA one hour prior to leaving for the hospital.     Please do not hesitate to call our office with any questions regarding your surgery.                  Post-Operative Care Information   Hemorrhoidectomy, EUA, Fissurectomy, Fistulotomy, Sphincterotomy      Resume high fiber diet. Fiber supplementation with Metamucil or Konsyl also recommended daily.       Your first bowel movement may take up to 3 days after surgery. THIS IS OFTEN PAINFUL.      While taking narcotic pain medication, you should remain on an over-the-counter stool softener such as Colace (one tablet twice  daily). For constipation Miralax can be taken up to three times daily.     Pain is to be expected after hemorrhoid surgery. Ibuprofen (400? 600MG) every 6?8 hours is an excellent alternative in addition or as a substitute to your narcotic pain medication.     Rectal bleeding or drainage is normal after surgery.       Nausea is a common complaint post op. This can be associated with the narcotic pain medications, anesthesia as well as with severe constipation.     Driving may be resumed when all off all narcotic pain medications and you can turn or twist your body without hesitation.    If you are unable to urinate within 8 hours after surgery, please call the office at [phone]    Frequent warm tub soaks or warm showers are often soothing, we suggest 3 to 4 times daily.    After bowel movements, you may wash with a hand shower or warm tub soak.  No soap is okay.     No strenuous activity (i.e., Running, jogging, swimming, or weightlifting) for up to one week after surgery.     When will I receive follow-up care?      You should be scheduled for a post-op appointment within 6-8 weeks after surgery, unless otherwise instructed on your discharge summary. If you do not have an existing appointment at the time of discharge, please call our office at [phone].    Call the office at [phone] immediately if you have a fever, chills, excessive sweating, difficulty urinating, or excessive/profuse bleeding with clots.

## 2025-01-23 NOTE — TELEPHONE ENCOUNTER
Patient scheduled for surgery 2/7/2025 at [facility]. Instructions and PAT's gone over with and handed to the patient.   Post Op    Colonoscopy scheduled for 4/30/25 at  w/ [facility], instructions handed to patient.       ----- Message from OSCAR Norwood MD sent at 1/23/2025  1:01 PM EST -----  OR  colonoscopy

## 2025-01-24 ENCOUNTER — ANESTHESIA EVENT (OUTPATIENT)
Dept: PERIOP | Facility: AMBULARY SURGERY CENTER | Age: 55
End: 2025-01-24
Payer: COMMERCIAL

## 2025-01-28 NOTE — PRE-PROCEDURE INSTRUCTIONS
Pre-Surgery Instructions:   Medication Instructions    acetaminophen (TYLENOL) 500 mg tablet Uses PRN- OK to take day of surgery    Ibuprofen (ADVIL PO) Stop taking this medication at least 3 days prior to surgery/procedure   Medication instructions for day surgery reviewed. Please use only a sip of water to take your instructed medications. Avoid all over the counter vitamins, supplements and NSAIDS for one week prior to surgery per anesthesia guidelines. Tylenol is ok to take as needed.     You will receive a call one business day prior to surgery with an arrival time and hospital directions. If your surgery is scheduled on a Monday, the hospital will be calling you on the Friday prior to your surgery. If you have not heard from anyone by 8pm, please call the hospital supervisor through the hospital  at [phone]. ([place] [phone] or [place] [phone]).    Do not eat or drink anything after midnight the night before your surgery, including candy, mints, lifesavers, or chewing gum. Do not drink alcohol 24hrs before your surgery. Try not to smoke at least 24hrs before your surgery.       Follow the pre surgery showering instructions as listed in the “My Surgical Experience Booklet” or otherwise provided by your surgeon's office. Do not use a blade to shave the surgical area 1 week before surgery. It is okay to use a clean electric clippers up to 24 hours before surgery. Do not apply any lotions, creams, including makeup, cologne, deodorant, or perfumes after showering on the day of your surgery. Do not use dry shampoo, hair spray, hair gel, or any type of hair products.     No contact lenses, eye make-up, or artificial eyelashes. Remove nail polish, including gel polish, and any artificial, gel, or acrylic nails if possible. Remove all jewelry including rings and body piercing jewelry.     Wear causal clothing that is easy to take on and off. Consider your type of surgery.    Keep any valuables,  jewelry, piercings at home. Please bring any specially ordered equipment (sling, braces) if indicated.    Arrange for a responsible person to drive you to and from the hospital on the day of your surgery. Please confirm the visitor policy for the day of your procedure when you receive your phone call with an arrival time.     Call the surgeon's office with any new illnesses, exposures, or additional questions prior to surgery.    Please reference your “My Surgical Experience Booklet” for additional information to prepare for your upcoming surgery.

## 2025-02-06 NOTE — ANESTHESIA PREPROCEDURE EVALUATION
Procedure:  INCISION AND DRAINAGE (I&D) PERINEAL (Anus)    Relevant Problems   ANESTHESIA   (+) Difficult intubation      Unremarkable stress test 2020    Airway history:  Placement Date: 11/20/23; Placement Time: 0022; Mask Ventilation: Mask ventilation not attempted (0); Technique: Rapid sequence, Stylet, Video laryngoscopy; Type: Cuffed, Oral; Tube Size: 6 mm; Laryngoscope: Mena; Blade Size: 3; Location: Oral; Grade View: 2a; Insertion Attempts: 1; Placement Verification: Auscultation, End tidal CO2, Symmetrical chest wall movement; Placed By: CRNA; Removal Date: 11/20/23; Removal Time: 0043     Physical Exam    Airway    Mallampati score: II  TM Distance: >3 FB  Neck ROM: full     Dental       Cardiovascular  Cardiovascular exam normal    Pulmonary  Pulmonary exam normal     Other Findings        Anesthesia Plan  ASA Score- 2     Anesthesia Type- general with ASA Monitors.         Additional Monitors:     Airway Plan: ETT.    Comment: Trina.       Plan Factors-    Chart reviewed. EKG reviewed.  Existing labs reviewed. Patient summary reviewed.                  Induction- intravenous.    Postoperative Plan-         Informed Consent- Anesthetic plan and risks discussed with patient.  I personally reviewed this patient with the CRNA. Discussed and agreed on the Anesthesia Plan with the CRNA..      NPO Status:  No vitals data found for the desired time range.

## 2025-02-07 ENCOUNTER — HOSPITAL ENCOUNTER (OUTPATIENT)
Facility: AMBULARY SURGERY CENTER | Age: 55
Setting detail: OUTPATIENT SURGERY
Discharge: HOME | End: 2025-02-07
Attending: COLON & RECTAL SURGERY | Admitting: COLON & RECTAL SURGERY
Payer: COMMERCIAL

## 2025-02-07 ENCOUNTER — ANESTHESIA (OUTPATIENT)
Dept: PERIOP | Facility: AMBULARY SURGERY CENTER | Age: 55
End: 2025-02-07
Payer: COMMERCIAL

## 2025-02-07 VITALS
OXYGEN SATURATION: 100 % | HEART RATE: 72 BPM | TEMPERATURE: 98 F | BODY MASS INDEX: 21 KG/M2 | WEIGHT: 150 LBS | RESPIRATION RATE: 18 BRPM | SYSTOLIC BLOOD PRESSURE: 153 MMHG | DIASTOLIC BLOOD PRESSURE: 91 MMHG | HEIGHT: 71 IN

## 2025-02-07 DIAGNOSIS — L73.2 HIDRADENITIS: ICD-10-CM

## 2025-02-07 DIAGNOSIS — K61.0 PERIANAL ABSCESS: Primary | ICD-10-CM

## 2025-02-07 PROCEDURE — 88304 TISSUE EXAM BY PATHOLOGIST: CPT | Performed by: PATHOLOGY

## 2025-02-07 PROCEDURE — 46060 I&D ISCHIORECTAL/NTRMRL ABSC: CPT | Performed by: COLON & RECTAL SURGERY

## 2025-02-07 RX ORDER — FENTANYL CITRATE/PF 50 MCG/ML
25 SYRINGE (ML) INJECTION
Status: DISCONTINUED | OUTPATIENT
Start: 2025-02-07 | End: 2025-02-07 | Stop reason: HOSPADM

## 2025-02-07 RX ORDER — HYDROMORPHONE HCL/PF 1 MG/ML
0.5 SYRINGE (ML) INJECTION
Status: DISCONTINUED | OUTPATIENT
Start: 2025-02-07 | End: 2025-02-07 | Stop reason: HOSPADM

## 2025-02-07 RX ORDER — OXYCODONE AND ACETAMINOPHEN 5; 325 MG/1; MG/1
1 TABLET ORAL EVERY 4 HOURS PRN
Qty: 10 TABLET | Refills: 0 | Status: SHIPPED | OUTPATIENT
Start: 2025-02-07 | End: 2025-02-12

## 2025-02-07 RX ORDER — ONDANSETRON 2 MG/ML
4 INJECTION INTRAMUSCULAR; INTRAVENOUS ONCE AS NEEDED
Status: DISCONTINUED | OUTPATIENT
Start: 2025-02-07 | End: 2025-02-07 | Stop reason: HOSPADM

## 2025-02-07 RX ORDER — MIDAZOLAM HYDROCHLORIDE 2 MG/2ML
INJECTION, SOLUTION INTRAMUSCULAR; INTRAVENOUS AS NEEDED
Status: DISCONTINUED | OUTPATIENT
Start: 2025-02-07 | End: 2025-02-07

## 2025-02-07 RX ORDER — MAGNESIUM HYDROXIDE 1200 MG/15ML
LIQUID ORAL AS NEEDED
Status: DISCONTINUED | OUTPATIENT
Start: 2025-02-07 | End: 2025-02-07 | Stop reason: HOSPADM

## 2025-02-07 RX ORDER — BUPIVACAINE HYDROCHLORIDE AND EPINEPHRINE 5; 5 MG/ML; UG/ML
INJECTION, SOLUTION EPIDURAL; INTRACAUDAL; PERINEURAL AS NEEDED
Status: DISCONTINUED | OUTPATIENT
Start: 2025-02-07 | End: 2025-02-07 | Stop reason: HOSPADM

## 2025-02-07 RX ORDER — LIDOCAINE HYDROCHLORIDE 10 MG/ML
INJECTION, SOLUTION EPIDURAL; INFILTRATION; INTRACAUDAL; PERINEURAL AS NEEDED
Status: DISCONTINUED | OUTPATIENT
Start: 2025-02-07 | End: 2025-02-07

## 2025-02-07 RX ORDER — PROPOFOL 10 MG/ML
INJECTION, EMULSION INTRAVENOUS AS NEEDED
Status: DISCONTINUED | OUTPATIENT
Start: 2025-02-07 | End: 2025-02-07

## 2025-02-07 RX ORDER — DEXAMETHASONE SODIUM PHOSPHATE 10 MG/ML
INJECTION, SOLUTION INTRAMUSCULAR; INTRAVENOUS AS NEEDED
Status: DISCONTINUED | OUTPATIENT
Start: 2025-02-07 | End: 2025-02-07

## 2025-02-07 RX ORDER — FENTANYL CITRATE 50 UG/ML
INJECTION, SOLUTION INTRAMUSCULAR; INTRAVENOUS AS NEEDED
Status: DISCONTINUED | OUTPATIENT
Start: 2025-02-07 | End: 2025-02-07

## 2025-02-07 RX ORDER — SODIUM CHLORIDE, SODIUM LACTATE, POTASSIUM CHLORIDE, CALCIUM CHLORIDE 600; 310; 30; 20 MG/100ML; MG/100ML; MG/100ML; MG/100ML
INJECTION, SOLUTION INTRAVENOUS CONTINUOUS PRN
Status: DISCONTINUED | OUTPATIENT
Start: 2025-02-07 | End: 2025-02-07

## 2025-02-07 RX ORDER — SUCCINYLCHOLINE/SOD CL,ISO/PF 100 MG/5ML
SYRINGE (ML) INTRAVENOUS AS NEEDED
Status: DISCONTINUED | OUTPATIENT
Start: 2025-02-07 | End: 2025-02-07

## 2025-02-07 RX ORDER — ONDANSETRON 2 MG/ML
INJECTION INTRAMUSCULAR; INTRAVENOUS AS NEEDED
Status: DISCONTINUED | OUTPATIENT
Start: 2025-02-07 | End: 2025-02-07

## 2025-02-07 RX ORDER — GLYCOPYRROLATE 0.2 MG/ML
INJECTION INTRAMUSCULAR; INTRAVENOUS AS NEEDED
Status: DISCONTINUED | OUTPATIENT
Start: 2025-02-07 | End: 2025-02-07

## 2025-02-07 RX ORDER — OXYCODONE AND ACETAMINOPHEN 5; 325 MG/1; MG/1
1 TABLET ORAL EVERY 4 HOURS PRN
Refills: 0 | Status: DISCONTINUED | OUTPATIENT
Start: 2025-02-07 | End: 2025-02-07 | Stop reason: HOSPADM

## 2025-02-07 RX ADMIN — LIDOCAINE HYDROCHLORIDE 50 MG: 10 INJECTION, SOLUTION EPIDURAL; INFILTRATION; INTRACAUDAL at 13:10

## 2025-02-07 RX ADMIN — FENTANYL CITRATE 50 MCG: 50 INJECTION INTRAMUSCULAR; INTRAVENOUS at 13:21

## 2025-02-07 RX ADMIN — FENTANYL CITRATE 25 MCG: 50 INJECTION INTRAMUSCULAR; INTRAVENOUS at 14:13

## 2025-02-07 RX ADMIN — FENTANYL CITRATE 25 MCG: 50 INJECTION INTRAMUSCULAR; INTRAVENOUS at 14:19

## 2025-02-07 RX ADMIN — SODIUM CHLORIDE, SODIUM LACTATE, POTASSIUM CHLORIDE, AND CALCIUM CHLORIDE: .6; .31; .03; .02 INJECTION, SOLUTION INTRAVENOUS at 13:09

## 2025-02-07 RX ADMIN — FENTANYL CITRATE 50 MCG: 50 INJECTION INTRAMUSCULAR; INTRAVENOUS at 13:10

## 2025-02-07 RX ADMIN — PROPOFOL 200 MG: 10 INJECTION, EMULSION INTRAVENOUS at 13:10

## 2025-02-07 RX ADMIN — Medication 200 MG: at 13:10

## 2025-02-07 RX ADMIN — SODIUM CHLORIDE, SODIUM LACTATE, POTASSIUM CHLORIDE, AND CALCIUM CHLORIDE: .6; .31; .03; .02 INJECTION, SOLUTION INTRAVENOUS at 12:58

## 2025-02-07 RX ADMIN — ONDANSETRON 4 MG: 2 INJECTION INTRAMUSCULAR; INTRAVENOUS at 13:12

## 2025-02-07 RX ADMIN — DEXAMETHASONE SODIUM PHOSPHATE 10 MG: 10 INJECTION INTRAMUSCULAR; INTRAVENOUS at 13:12

## 2025-02-07 RX ADMIN — MIDAZOLAM 2 MG: 1 INJECTION INTRAMUSCULAR; INTRAVENOUS at 13:06

## 2025-02-07 RX ADMIN — GLYCOPYRROLATE 0.2 MG: 0.2 INJECTION INTRAMUSCULAR; INTRAVENOUS at 13:10

## 2025-02-07 NOTE — OP NOTE
OPERATIVE REPORT  PATIENT NAME: Poncho Gonzalez    :  1970  MRN: [id number]  Pt Location: [facility] OR ROOM 04    SURGERY DATE: 2025    Surgeons and Role:     * OSCAR Norwood MD - Primary    Preop Diagnosis:  Hidradenitis L73.2    Postop diagnosis codes:  Ischiorectal abscess  Horseshoe abscess  Horseshoe fistulous  Posterior midline transanal fistula      Procedure(s):  EUA. INCISION AND DRAINAGE (I&D) ISCHIORECTAL ABCESS AND FISTULA. PLACEMENTOF SETON AND PENROSE DRAIN    Specimen(s):  ID Type Source Tests Collected by Time Destination   1 : ANAL FISTULA Tissue Fistula TISSUE EXAM OSCAR Norwood MD 2025 1341        Estimated Blood Loss:   Minimal    Drains:  * No LDAs found *    Anesthesia Type:   General    Operative Indications:  Hidradenitis L73.2  Ischiorectal abscess  Horseshoe abscess  Horseshoe fistulous  Posterior midline transanal fistula    Operative Findings:  Ischiorectal abscess  Horseshoe abscess  Horseshoe fistulous  Posterior midline transanal fistula    Complications:   None    Procedure and Technique:  The patient was placed in a prone jackknife position with the buttocks taped apart.  The anal area was prepped using Betadine and draped in a sterile manner.  A timeout was done.    External inspection revealed 2 separate, small openings on the right buttock.  I could easily probe between these 2.  They were within 2 cm of 1 another.  The skin between the 2 openings was opened, revealing a granulating base.  At the left buttock, approximately 3 or 4 cm from the anus, was a single, small opening draining exudate.  In the posterior midline there was a tiny punctum at the area approximately 1 cm from the anal verge.    Digital examination showed very dense thickening and scarring of the levator level musculature and puborectalis area.  There is seem to be a fixed and firm texture to the perianal and distal perirectal tissues.  This was highly suggestive of chronic inflammatory  changes.    Further inspection with probing of the openings above revealed a posterior midline fistula that traversed perhaps 1/3-1/2 of the sphincter complex, traversing both external and internal sphincters based on my examination.  This is amenable to a fistulotomy but the patient was not prepared for that much fistulotomy and I feel that other drainage may help him long-term before initiating any fistulotomy procedure.  Probing the lateral openings on the buttocks, I found that the probe was passed into the ischiorectal space posteriorly without entry into the fistula tract that had been identified in the posterior midline.  Instead, they penetrated into the ischiorectal space and perhaps into the supralevator space but this could not be proven.    Given the findings, I could not establish a close approach of the left lateral fistula to the midline posteriorly.  However, I believe this to be a horseshoe extension of a prior abscess and fistula.  I unroofed the external opening to expose a granulating fistula and left it open.    On the right side, I could probe to near the midline but not into the same tract that I had already established as a fistula in the posterior midline.  I chose to penetrate into the posterior midline tissues where the probe passed to.  A midline opening was created from the posterior fistula and was extended into the ischiorectal space in the midline.  There, I passed a hemostat from the right buttock opening to the posterior midline.  A Penrose drain was brought through and tied into position.    In the posterior midline, silk tie was attached to the probe that had been passed through the fistula and was brought through.  It was then tied to a vessel loop retractor which was brought through, cut to appropriate length, and tied at 3 positions using silk ties.  This was a loose seton.    No further therapy seemed indicated.  I hope drainage allows for improvement of the inflammatory  changes.  These changes may be decades old but are certainly chronic.  I will consider an MRI to further evaluate this complex fistula before fistulotomy is performed.       I was present for the entire procedure.    Patient Disposition:  PACU     SIGNATURE: ALIN Norwood MD  DATE: February 7, 2025  TIME: 1:48 PM

## 2025-02-07 NOTE — ANESTHESIA POSTPROCEDURE EVALUATION
Post-Op Assessment Note    CV Status:  Stable  Pain Score: 0    Pain management: adequate       Mental Status:  Sleepy and arousable   Hydration Status:  Euvolemic   PONV Controlled:  Controlled   Airway Patency:  Patent     Post Op Vitals Reviewed: Yes    No anethesia notable event occurred.    Staff: Anesthesiologist, CRNA           Last Filed PACU Vitals:  Vitals Value Taken Time   Temp 97.1 °F (36.2 °C) 02/07/25 1359   Pulse 82 02/07/25 1359   /75 02/07/25 1359   Resp 12 02/07/25 1359   SpO2 100 % 02/07/25 1359

## 2025-02-07 NOTE — ANESTHESIA POSTPROCEDURE EVALUATION
Post-Op Assessment Note    CV Status:  Stable  Pain Score: 0    Pain management: adequate       Mental Status:  Alert and awake   Hydration Status:  Euvolemic   PONV Controlled:  Controlled   Airway Patency:  Patent     Post Op Vitals Reviewed: Yes    No anethesia notable event occurred.    Staff: Anesthesiologist, CRNA           Last Filed PACU Vitals:  Vitals Value Taken Time   Temp 97.1 °F (36.2 °C) 02/07/25 1359   Pulse 74 02/07/25 1433   /88 02/07/25 1430   Resp 16 02/07/25 1433   SpO2 100 % 02/07/25 1433   Vitals shown include unfiled device data.    Modified Brianna:     Vitals Value Taken Time   Activity 2 02/07/25 1430   Respiration 2 02/07/25 1430   Circulation 2 02/07/25 1430   Consciousness 1 02/07/25 1430   Oxygen Saturation 2 02/07/25 1430     Modified Brianna Score: 9

## 2025-02-07 NOTE — INTERVAL H&P NOTE
H&P reviewed. After examining the patient I find no changes in the patients condition since the H&P had been written.    Vitals:    02/07/25 1132   BP: 123/82   Pulse: 77   Resp: 18   Temp: 97.8 °F (36.6 °C)   SpO2: 99%

## 2025-02-09 ENCOUNTER — NURSE TRIAGE (OUTPATIENT)
Dept: OTHER | Facility: OTHER | Age: 55
End: 2025-02-09

## 2025-02-09 NOTE — TELEPHONE ENCOUNTER
"Regarding: Post op/severe pain  ----- Message from Sandi YORK sent at 2/9/2025 10:23 AM EST -----  \"My  just had surgery and we wanted to know if the oxycodone could be refilled. He is in a lot of pain.\"    Pt wife is aware that narcotics can not be filled in weekend. I told patient that maybe something else could be done about severe pain and pt wife would like to speak to a nurse\"    "

## 2025-02-09 NOTE — TELEPHONE ENCOUNTER
"Patient calling for oxycodone refill due to surgery related pain. Reviewed AVS with patient's wife and discussed using the tylenol and ibprofen as outlined. Recommended they call back if pain continues to be uncontrolled after using the above recommendations. Patient's wife verbalized understanding.     Reason for Disposition   Caller requesting a CONTROLLED substance prescription refill (e.g., narcotics, ADHD medicines)    Answer Assessment - Initial Assessment Questions  1. DRUG NAME: \"What medicine do you need to have refilled?\"        Oxycodone    2. REFILLS REMAINING: \"How many refills are remaining?\" (Note: The label on the medicine or pill bottle will show how many refills are remaining. If there are no refills remaining, then a renewal may be needed.)        0    3. EXPIRATION DATE: \"What is the expiration date?\" (Note: The label states when the prescription will , and thus can no longer be refilled.)      0    4. PRESCRIBING HCP: \"Who prescribed it?\" Reason: If prescribed by specialist, call should be referred to that group.        OSCAR Norwood MD    5. SYMPTOMS: \"Do you have any symptoms?\"        Pain    Protocols used: Medication Refill and Renewal Call-Adult-    "

## 2025-02-11 DIAGNOSIS — K61.0 PERIANAL ABSCESS: ICD-10-CM

## 2025-02-11 NOTE — TELEPHONE ENCOUNTER
oxyCODONE-acetaminophen (PERCOCET) 5-325 mg per tablet  Take 1 tablet by mouth every 4 (four) hours as needed for moderate pain for up to 5 days Max Daily Amount: 6 tablets    [facility]/pharmacy #[id number] - [place], [place] - [address] Ilan Norwood MD - Colon and Rectal Surgery [place]    Needs HP   And patient is still having pain

## 2025-02-11 NOTE — TELEPHONE ENCOUNTER
Patient said that the acetaminophen and ibuprofen are not working for him (refer to nurse triage call 02/09/25). He said he is still in pain and is requesting a refill of oxycodone-acetaminophen    Reason for call:   [x] Refill   [] Prior Auth  [] Other:     Office:   [x] Specialty/Provider - [facility] / Norwood    Medication: oxycodone-acetaminophen    Dose/Frequency: 5-325mg q4 prn    Quantity: 10    Pharmacy: [id number] [address]     Does the patient have enough for 3 days?   [] Yes   [x] No - Send as HP to POD

## 2025-02-12 ENCOUNTER — RESULTS FOLLOW-UP (OUTPATIENT)
Age: 55
End: 2025-02-12

## 2025-02-12 ENCOUNTER — NURSE TRIAGE (OUTPATIENT)
Dept: OTHER | Facility: OTHER | Age: 55
End: 2025-02-12

## 2025-02-12 PROCEDURE — 88304 TISSUE EXAM BY PATHOLOGIST: CPT | Performed by: PATHOLOGY

## 2025-02-12 NOTE — TELEPHONE ENCOUNTER
Patient called to request a refill for their oxycodone-ace advised a refill was requested on 02/11/2025 and is pending approval. Patient verbalized understanding and is in agreement.     Does the patient have enough for 3 days?   [] Yes   [x] No - Send as HP to POD     Pt called back again and stated he is in a lot of pain and needs this medication ASAP

## 2025-02-12 NOTE — TELEPHONE ENCOUNTER
On call provider notified with patients symptoms. On call provider stated to continue to alternate tylenol and motrin. Tub soaks. Make sure not constipated and follow up with Dr. Norwood this week if pain continues. Notified wife and wife verbalized understanding and has no further questions.

## 2025-02-12 NOTE — TELEPHONE ENCOUNTER
"Regarding: Post OP / Pain  ----- Message from Oscar GERONIMO sent at 2/12/2025  5:47 PM EST -----  \"My  had surgery and he is in pain. We have called several times to request a refill for the Percocet and they have not sent it.\" Looking for advice.    "

## 2025-02-12 NOTE — TELEPHONE ENCOUNTER
"Reason for Disposition  • [1] SEVERE post-op pain (e.g., excruciating, pain scale 8-10) AND [2] not controlled with pain medications    Answer Assessment - Initial Assessment Questions  1. SYMPTOM: \"What's the main symptom you're concerned about?\" (e.g., pain, fever, vomiting)      Rectal pain and burning    2. ONSET: \"When did S/S start?\"      Ongoing since the surgery    3. SURGERY: \"What surgery did you have?\"      EUA, INCISION AND DRAINAGE (I&D) ISCHEAL RECTAL ABCESS ADN FISTULA, PLACEMENTOF SETON, AND PENROSE DRAIN (Anus)    4. DATE of SURGERY: \"When was the surgery?\"       2/7/25    5. ANESTHESIA: \"What type of anesthesia did you have?\" (e.g., general, spinal, epidural, local)      General    6. DRAINS: \"Were any drains place in or around the wound?\" (e.g., Hemovac, Jose Cruz-Vyas, Penrose)      Denies    7. PAIN: \"Is there any pain?\" If Yes, ask: \"How bad is it?\"  (Scale 1-10; or mild, moderate, severe)      8/10    8. FEVER: \"Do you have a fever?\" If Yes, ask: \"What is your temperature, how was it measured, and when did it start?\"      Denies    9. VOMITING: \"Is there any vomiting?\" If Yes, ask: \"How many times?\"      Denies    10. BLEEDING: \"Is there any bleeding?\" If Yes, ask: \"How much?\" and \"Where?\"        Denies    11. OTHER SYMPTOMS: \"Do you have any other symptoms?\" (e.g., drainage from wound, painful urination, constipation)        Denies    Tylenol liquid 480 ml at 1500 with no relief in pain   Ibuprofen 400 mg with no relief in pain    Protocols used: Post-Op Symptoms and Questions-Adult-[facility]    "

## 2025-02-13 NOTE — TELEPHONE ENCOUNTER
Patient's wife called back to check status of request, advising patient is having a lot of pain and is in need of the prescription.  Asked if it could please be sent to the pharmacy as soon as possible.

## 2025-02-14 RX ORDER — OXYCODONE AND ACETAMINOPHEN 5; 325 MG/1; MG/1
1 TABLET ORAL EVERY 4 HOURS PRN
Qty: 10 TABLET | Refills: 0 | OUTPATIENT
Start: 2025-02-14 | End: 2025-02-19

## 2025-02-14 NOTE — TELEPHONE ENCOUNTER
"LOV: 1/23/25    EUA, INCISION AND DRAINAGE (I&D) ISCHEAL RECTAL ABCESS ADN FISTULA, PLACEMENTOF SETON, AND PENROSE DRAIN 2/7/25    HX Hidradenitis suppurativa of anus    Pt transferred to nurse line. Reviewed provider's note about narcotic pain medication.  Pt states he is having severe rectal pain, 8/10.  Pain wakes him from sleep.  He is using Ibuprofen, Tylenol and doing sitz baths with but no relief. Pt asking if he can go to the ED because the pain is so severe.     Recommend ED for eval.    Reason for Disposition   SEVERE pain in the incision    Additional Information   Surgical incision symptoms and questions    Answer Assessment - Initial Assessment Questions  1. SYMPTOM: \"What's the main symptom you're concerned about?\" (e.g., pain, fever, vomiting)      Rectal pain  2. ONSET: \"When did pain  start?\"      Since surgery 2/7/25  3. SURGERY: \"What surgery did you have?\"      EUA, INCISION AND DRAINAGE (I&D) ISCHEAL RECTAL ABCESS ADN FISTULA, PLACEMENTOF SETON, AND PENROSE DRAIN  4. DATE of SURGERY: \"When was the surgery?\"       2/7/25  5. ANESTHESIA: \"What type of anesthesia did you have?\" (e.g., general, spinal, epidural, local)      General   6. DRAINS: \"Were any drains place in or around the wound?\" (e.g., Hemovac, Jose Cruz-Vyas, Penrose)      Penrose  7. PAIN: \"Is there any pain?\" If Yes, ask: \"How bad is it?\"  (Scale 1-10; or mild, moderate, severe)      8/10 \"severe\"   8. FEVER: \"Do you have a fever?\" If Yes, ask: \"What is your temperature, how was it measured, and when did it start?\"      No   9. VOMITING: \"Is there any vomiting?\" If Yes, ask: \"How many times?\"      No   10. BLEEDING: \"Is there any bleeding?\" If Yes, ask: \"How much?\" and \"Where?\"        No   11. OTHER SYMPTOMS: \"Do you have any other symptoms?\" (e.g., drainage from wound, painful urination, constipation)        None    Protocols used: Post-Op Symptoms and Questions-Adult-[facility], Post-Op Incision Symptoms and Questions-Adult-[facility]    "

## 2025-02-16 ENCOUNTER — NURSE TRIAGE (OUTPATIENT)
Dept: OTHER | Facility: OTHER | Age: 55
End: 2025-02-16

## 2025-02-16 NOTE — TELEPHONE ENCOUNTER
"Reason for Disposition   Sounds like a serious complication to the triager    Answer Assessment - Initial Assessment Questions  1. SYMPTOM: \"What's the main symptom you're concerned about?\" (e.g., pain, fever, vomiting)        Pain    2. ONSET: \"When did pain  start?\"        Worse since surgery    3. SURGERY: \"What surgery did you have?\"        Perianal abscess    4. DATE of SURGERY: \"When was the surgery?\"         2/7    5. ANESTHESIA: \"What type of anesthesia did you have?\" (e.g., general, spinal, epidural, local)      NA    6. DRAINS: \"Were any drains place in or around the wound?\" (e.g., Hemovac, Jose Cruz-Vyas, Penrose)      NA    7. PAIN: \"Is there any pain?\" If Yes, ask: \"How bad is it?\"  (Scale 1-10; or mild, moderate, severe)        12/10    8. FEVER: \"Do you have a fever?\" If Yes, ask: \"What is your temperature, how was it measured, and when did it start?\"      Denies    9. VOMITING: \"Is there any vomiting?\" If Yes, ask: \"How many times?\"      NA    10. BLEEDING: \"Is there any bleeding?\" If Yes, ask: \"How much?\" and \"Where?\"          (Pt did not answer question)    11. OTHER SYMPTOMS: \"Do you have any other symptoms?\" (e.g., drainage from wound, painful urination, constipation)          Reports ongoing pain    Protocols used: Post-Op Symptoms and Questions-Adult-      Patient referred to ED for extent of pain described.  Patient agreeable to be seen.  "

## 2025-02-17 NOTE — TELEPHONE ENCOUNTER
Spoke with patient's spouse on 2/14 to schedule a sooner post op per Dr. Norwood. Advised spouse that per , patient should continue sitz baths and continue taking tylenol and ibuprofen until his appt on 2/20 or if in extreme pain, he may go to the ED.

## 2025-04-29 ENCOUNTER — TELEPHONE (OUTPATIENT)
Age: 55
End: 2025-04-29

## 2025-04-29 ENCOUNTER — TELEPHONE (OUTPATIENT)
Dept: GASTROENTEROLOGY | Facility: HOSPITAL | Age: 55
End: 2025-04-29

## 2025-04-29 NOTE — TELEPHONE ENCOUNTER
Pt calling in for arrival time of his colonoscopy tomorrow. Advised pt that he will get a call today with the exact time he should arrive. Verified correct address for procedure and GI phone number. Pt verbalized that he understands his prep instructions. No further needs at this time.

## 2025-04-29 NOTE — TELEPHONE ENCOUNTER
Pt returning call. Not able to get anyone from [facility] lab on line. Advised pt that lab will be giving a call back shortly to advise on time.Pt confirmed understanding.

## 2025-04-30 ENCOUNTER — ANESTHESIA EVENT (OUTPATIENT)
Dept: GASTROENTEROLOGY | Facility: HOSPITAL | Age: 55
End: 2025-04-30
Payer: COMMERCIAL

## 2025-04-30 ENCOUNTER — HOSPITAL ENCOUNTER (OUTPATIENT)
Dept: GASTROENTEROLOGY | Facility: HOSPITAL | Age: 55
Setting detail: OUTPATIENT SURGERY
Discharge: HOME | End: 2025-04-30
Attending: COLON & RECTAL SURGERY
Payer: COMMERCIAL

## 2025-04-30 ENCOUNTER — ANESTHESIA (OUTPATIENT)
Dept: GASTROENTEROLOGY | Facility: HOSPITAL | Age: 55
End: 2025-04-30
Payer: COMMERCIAL

## 2025-04-30 VITALS
HEART RATE: 65 BPM | WEIGHT: 150 LBS | TEMPERATURE: 97.6 F | DIASTOLIC BLOOD PRESSURE: 78 MMHG | SYSTOLIC BLOOD PRESSURE: 113 MMHG | RESPIRATION RATE: 16 BRPM | BODY MASS INDEX: 20.92 KG/M2 | OXYGEN SATURATION: 99 %

## 2025-04-30 DIAGNOSIS — Z12.11 ENCOUNTER FOR SCREENING COLONOSCOPY: ICD-10-CM

## 2025-04-30 DIAGNOSIS — L73.2 HIDRADENITIS SUPPURATIVA OF ANUS: ICD-10-CM

## 2025-04-30 PROCEDURE — 45378 DIAGNOSTIC COLONOSCOPY: CPT | Performed by: COLON & RECTAL SURGERY

## 2025-04-30 RX ORDER — DIPHENHYDRAMINE HYDROCHLORIDE 50 MG/ML
12.5 INJECTION, SOLUTION INTRAMUSCULAR; INTRAVENOUS ONCE AS NEEDED
Status: CANCELLED | OUTPATIENT
Start: 2025-04-30

## 2025-04-30 RX ORDER — METOCLOPRAMIDE HYDROCHLORIDE 5 MG/ML
10 INJECTION INTRAMUSCULAR; INTRAVENOUS ONCE AS NEEDED
Status: CANCELLED | OUTPATIENT
Start: 2025-04-30

## 2025-04-30 RX ORDER — PROPOFOL 10 MG/ML
INJECTION, EMULSION INTRAVENOUS AS NEEDED
Status: DISCONTINUED | OUTPATIENT
Start: 2025-04-30 | End: 2025-04-30

## 2025-04-30 RX ORDER — SODIUM CHLORIDE, SODIUM LACTATE, POTASSIUM CHLORIDE, CALCIUM CHLORIDE 600; 310; 30; 20 MG/100ML; MG/100ML; MG/100ML; MG/100ML
125 INJECTION, SOLUTION INTRAVENOUS CONTINUOUS
Status: DISCONTINUED | OUTPATIENT
Start: 2025-04-30 | End: 2025-05-04 | Stop reason: HOSPADM

## 2025-04-30 RX ORDER — ONDANSETRON 2 MG/ML
4 INJECTION INTRAMUSCULAR; INTRAVENOUS ONCE AS NEEDED
Status: CANCELLED | OUTPATIENT
Start: 2025-04-30

## 2025-04-30 RX ORDER — SODIUM CHLORIDE, SODIUM LACTATE, POTASSIUM CHLORIDE, CALCIUM CHLORIDE 600; 310; 30; 20 MG/100ML; MG/100ML; MG/100ML; MG/100ML
INJECTION, SOLUTION INTRAVENOUS CONTINUOUS PRN
Status: DISCONTINUED | OUTPATIENT
Start: 2025-04-30 | End: 2025-04-30

## 2025-04-30 RX ORDER — LIDOCAINE HYDROCHLORIDE 10 MG/ML
0.5 INJECTION, SOLUTION EPIDURAL; INFILTRATION; INTRACAUDAL; PERINEURAL ONCE AS NEEDED
Status: DISCONTINUED | OUTPATIENT
Start: 2025-04-30 | End: 2025-05-04 | Stop reason: HOSPADM

## 2025-04-30 RX ORDER — LIDOCAINE HYDROCHLORIDE 20 MG/ML
INJECTION, SOLUTION EPIDURAL; INFILTRATION; INTRACAUDAL; PERINEURAL AS NEEDED
Status: DISCONTINUED | OUTPATIENT
Start: 2025-04-30 | End: 2025-04-30

## 2025-04-30 RX ADMIN — LIDOCAINE HYDROCHLORIDE 80 MG: 20 INJECTION, SOLUTION EPIDURAL; INFILTRATION; INTRACAUDAL; PERINEURAL at 08:09

## 2025-04-30 RX ADMIN — PROPOFOL 25 MG: 10 INJECTION, EMULSION INTRAVENOUS at 08:12

## 2025-04-30 RX ADMIN — PROPOFOL 75 MG: 10 INJECTION, EMULSION INTRAVENOUS at 08:09

## 2025-04-30 RX ADMIN — PROPOFOL 50 MG: 10 INJECTION, EMULSION INTRAVENOUS at 08:10

## 2025-04-30 RX ADMIN — SODIUM CHLORIDE, SODIUM LACTATE, POTASSIUM CHLORIDE, AND CALCIUM CHLORIDE: .6; .31; .03; .02 INJECTION, SOLUTION INTRAVENOUS at 08:03

## 2025-04-30 RX ADMIN — PROPOFOL 25 MG: 10 INJECTION, EMULSION INTRAVENOUS at 08:11

## 2025-04-30 NOTE — ANESTHESIA PREPROCEDURE EVALUATION
Procedure:  COLONOSCOPY    Relevant Problems   ANESTHESIA   (+) Difficult intubation      CARDIO (within normal limits)      ENDO (within normal limits)      GI/HEPATIC (within normal limits)      /RENAL (within normal limits)      HEMATOLOGY (within normal limits)      MUSCULOSKELETAL (within normal limits)      NEURO/PSYCH (within normal limits)      PULMONARY (within normal limits)      Care Coordination   (+) Frequent bowel movements      Dermatology   (+) Hidradenitis suppurativa of anus        Physical Exam    Airway    Mallampati score: II  TM Distance: >3 FB  Neck ROM: full     Dental   No notable dental hx     Cardiovascular  Rhythm: regular, Rate: normal, Cardiovascular exam normal    Pulmonary  Pulmonary exam normal Breath sounds clear to auscultation    Other Findings        Anesthesia Plan  ASA Score- 2     Anesthesia Type- IV sedation with anesthesia with ASA Monitors.         Additional Monitors:     Airway Plan:            Plan Factors-Exercise tolerance (METS): >4 METS.    Chart reviewed. EKG reviewed. Imaging results reviewed. Existing labs reviewed. Patient summary reviewed.                  Induction- intravenous.    Postoperative Plan-     Perioperative Resuscitation Plan - Level 1 - Full Code.       Informed Consent- Anesthetic plan and risks discussed with patient.  I personally reviewed this patient with the CRNA. Discussed and agreed on the Anesthesia Plan with the CRNA..      NPO Status:  Vitals Value Taken Time   Date of last liquid 04/29/25 04/30/25 0726   Time of last liquid 2300 04/30/25 0726   Date of last solid 04/28/25 04/30/25 0726   Time of last solid 2100 04/30/25 0726       Recent labs personally reviewed:  Lab Results   Component Value Date    WBC 5.56 11/07/2024    HGB 10.9 (L) 11/07/2024     11/07/2024     Lab Results   Component Value Date    K 4.5 11/07/2024    BUN 19 11/07/2024    CREATININE 0.90 11/07/2024       I, Domenic Mcneill MD, have personally seen and  evaluated the patient prior to anesthetic care.  I have reviewed the pre-anesthetic record, and other medical records if appropriate to the anesthetic care.  If a CRNA is involved in the case, I have reviewed the CRNA assessment, if present, and agree. Risks/benefits and alternatives discussed with patient including possible PONV, sore throat, and possibility of rare anesthetic and surgical emergencies.
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
## 2025-06-20 ENCOUNTER — APPOINTMENT (EMERGENCY)
Dept: RADIOLOGY | Facility: HOSPITAL | Age: 55
End: 2025-06-20
Payer: COMMERCIAL

## 2025-06-20 ENCOUNTER — HOSPITAL ENCOUNTER (EMERGENCY)
Facility: HOSPITAL | Age: 55
Discharge: HOME | End: 2025-06-21
Attending: EMERGENCY MEDICINE
Payer: COMMERCIAL

## 2025-06-20 DIAGNOSIS — S09.90XA CLOSED HEAD INJURY, INITIAL ENCOUNTER: ICD-10-CM
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
## 2025-06-20 DIAGNOSIS — F10.920 ALCOHOLIC INTOXICATION WITHOUT COMPLICATION (HCC): Primary | ICD-10-CM

## 2025-06-20 LAB
ANION GAP SERPL CALCULATED.3IONS-SCNC: 9 MMOL/L (ref 4–13)
ANION GAP SERPL CALCULATED.3IONS-SCNC: 9 MMOL/L (ref 4–13)
BASOPHILS # BLD AUTO: 0.03 THOUSANDS/ÂΜL (ref 0–0.1)
BASOPHILS # BLD AUTO: 0.03 THOUSANDS/ÂΜL (ref 0–0.1)
BASOPHILS NFR BLD AUTO: 1 % (ref 0–1)
BASOPHILS NFR BLD AUTO: 1 % (ref 0–1)
BUN SERPL-MCNC: 12 MG/DL (ref 5–25)
BUN SERPL-MCNC: 12 MG/DL (ref 5–25)
CALCIUM SERPL-MCNC: 7.9 MG/DL (ref 8.4–10.2)
CALCIUM SERPL-MCNC: 7.9 MG/DL (ref 8.4–10.2)
CHLORIDE SERPL-SCNC: 109 MMOL/L (ref 96–108)
CHLORIDE SERPL-SCNC: 109 MMOL/L (ref 96–108)
CO2 SERPL-SCNC: 23 MMOL/L (ref 21–32)
CO2 SERPL-SCNC: 23 MMOL/L (ref 21–32)
CREAT SERPL-MCNC: 0.71 MG/DL (ref 0.6–1.3)
CREAT SERPL-MCNC: 0.71 MG/DL (ref 0.6–1.3)
EOSINOPHIL # BLD AUTO: 0.03 THOUSAND/ÂΜL (ref 0–0.61)
EOSINOPHIL # BLD AUTO: 0.03 THOUSAND/ÂΜL (ref 0–0.61)
EOSINOPHIL NFR BLD AUTO: 1 % (ref 0–6)
EOSINOPHIL NFR BLD AUTO: 1 % (ref 0–6)
ERYTHROCYTE [DISTWIDTH] IN BLOOD BY AUTOMATED COUNT: 16.1 % (ref 11.6–15.1)
ERYTHROCYTE [DISTWIDTH] IN BLOOD BY AUTOMATED COUNT: 16.1 % (ref 11.6–15.1)
GFR SERPL CREATININE-BSD FRML MDRD: 104 ML/MIN/1.73SQ M
GFR SERPL CREATININE-BSD FRML MDRD: 104 ML/MIN/1.73SQ M
GLUCOSE SERPL-MCNC: 83 MG/DL (ref 65–140)
GLUCOSE SERPL-MCNC: 83 MG/DL (ref 65–140)
GLUCOSE SERPL-MCNC: 86 MG/DL (ref 65–140)
GLUCOSE SERPL-MCNC: 86 MG/DL (ref 65–140)
HCT VFR BLD AUTO: 27.6 % (ref 36.5–49.3)
HCT VFR BLD AUTO: 27.6 % (ref 36.5–49.3)
HGB BLD-MCNC: 10.6 G/DL (ref 12–17)
HGB BLD-MCNC: 10.6 G/DL (ref 12–17)
IMM GRANULOCYTES # BLD AUTO: 0.01 THOUSAND/UL (ref 0–0.2)
IMM GRANULOCYTES # BLD AUTO: 0.01 THOUSAND/UL (ref 0–0.2)
IMM GRANULOCYTES NFR BLD AUTO: 0 % (ref 0–2)
IMM GRANULOCYTES NFR BLD AUTO: 0 % (ref 0–2)
LYMPHOCYTES # BLD AUTO: 3.32 THOUSANDS/ÂΜL (ref 0.6–4.47)
LYMPHOCYTES # BLD AUTO: 3.32 THOUSANDS/ÂΜL (ref 0.6–4.47)
LYMPHOCYTES NFR BLD AUTO: 61 % (ref 14–44)
LYMPHOCYTES NFR BLD AUTO: 61 % (ref 14–44)
MCH RBC QN AUTO: 40.8 PG (ref 26.8–34.3)
MCH RBC QN AUTO: 40.8 PG (ref 26.8–34.3)
MCHC RBC AUTO-ENTMCNC: 38.2 G/DL (ref 31.4–37.4)
MCHC RBC AUTO-ENTMCNC: 38.2 G/DL (ref 31.4–37.4)
MCV RBC AUTO: 106 FL (ref 82–98)
MCV RBC AUTO: 106 FL (ref 82–98)
MONOCYTES # BLD AUTO: 0.55 THOUSAND/ÂΜL (ref 0.17–1.22)
MONOCYTES # BLD AUTO: 0.55 THOUSAND/ÂΜL (ref 0.17–1.22)
MONOCYTES NFR BLD AUTO: 10 % (ref 4–12)
MONOCYTES NFR BLD AUTO: 10 % (ref 4–12)
NEUTROPHILS # BLD AUTO: 1.45 THOUSANDS/ÂΜL (ref 1.85–7.62)
NEUTROPHILS # BLD AUTO: 1.45 THOUSANDS/ÂΜL (ref 1.85–7.62)
NEUTS SEG NFR BLD AUTO: 27 % (ref 43–75)
NEUTS SEG NFR BLD AUTO: 27 % (ref 43–75)
NRBC BLD AUTO-RTO: 2 /100 WBCS
NRBC BLD AUTO-RTO: 2 /100 WBCS
PLATELET # BLD AUTO: 235 THOUSANDS/UL (ref 149–390)
PLATELET # BLD AUTO: 235 THOUSANDS/UL (ref 149–390)
PMV BLD AUTO: 10.2 FL (ref 8.9–12.7)
PMV BLD AUTO: 10.2 FL (ref 8.9–12.7)
POTASSIUM SERPL-SCNC: 4.2 MMOL/L (ref 3.5–5.3)
POTASSIUM SERPL-SCNC: 4.2 MMOL/L (ref 3.5–5.3)
RBC # BLD AUTO: 2.6 MILLION/UL (ref 3.88–5.62)
RBC # BLD AUTO: 2.6 MILLION/UL (ref 3.88–5.62)
SODIUM SERPL-SCNC: 141 MMOL/L (ref 135–147)
SODIUM SERPL-SCNC: 141 MMOL/L (ref 135–147)
WBC # BLD AUTO: 5.39 THOUSAND/UL (ref 4.31–10.16)
WBC # BLD AUTO: 5.39 THOUSAND/UL (ref 4.31–10.16)

## 2025-06-20 PROCEDURE — 70496 CT ANGIOGRAPHY HEAD: CPT

## 2025-06-20 PROCEDURE — 36415 COLL VENOUS BLD VENIPUNCTURE: CPT | Performed by: EMERGENCY MEDICINE

## 2025-06-20 PROCEDURE — 96365 THER/PROPH/DIAG IV INF INIT: CPT

## 2025-06-20 PROCEDURE — 82948 REAGENT STRIP/BLOOD GLUCOSE: CPT

## 2025-06-20 PROCEDURE — 80048 BASIC METABOLIC PNL TOTAL CA: CPT | Performed by: EMERGENCY MEDICINE

## 2025-06-20 PROCEDURE — 85025 COMPLETE CBC W/AUTO DIFF WBC: CPT | Performed by: EMERGENCY MEDICINE

## 2025-06-20 PROCEDURE — 99285 EMERGENCY DEPT VISIT HI MDM: CPT | Performed by: EMERGENCY MEDICINE

## 2025-06-20 PROCEDURE — 99285 EMERGENCY DEPT VISIT HI MDM: CPT

## 2025-06-20 PROCEDURE — 93005 ELECTROCARDIOGRAM TRACING: CPT

## 2025-06-20 PROCEDURE — 70498 CT ANGIOGRAPHY NECK: CPT

## 2025-06-20 RX ORDER — THIAMINE HYDROCHLORIDE 100 MG/ML
INJECTION, SOLUTION INTRAMUSCULAR; INTRAVENOUS
Status: COMPLETED
Start: 2025-06-20 | End: 2025-06-20

## 2025-06-20 RX ORDER — IPRATROPIUM BROMIDE AND ALBUTEROL SULFATE 2.5; .5 MG/3ML; MG/3ML
SOLUTION RESPIRATORY (INHALATION)
Status: DISCONTINUED
Start: 2025-06-20 | End: 2025-06-20

## 2025-06-20 RX ORDER — FOLIC ACID 1 MG/1
1 TABLET ORAL ONCE
Status: COMPLETED | OUTPATIENT
Start: 2025-06-20 | End: 2025-06-20

## 2025-06-20 RX ADMIN — FOLIC ACID 1 MG: 1 TABLET ORAL at 23:51

## 2025-06-20 RX ADMIN — IOHEXOL 85 ML: 350 INJECTION, SOLUTION INTRAVENOUS at 23:21

## 2025-06-20 RX ADMIN — THIAMINE HYDROCHLORIDE 100 MG: 100 INJECTION, SOLUTION INTRAMUSCULAR; INTRAVENOUS at 23:48

## 2025-06-21 VITALS
RESPIRATION RATE: 18 BRPM | RESPIRATION RATE: 18 BRPM | HEART RATE: 72 BPM | SYSTOLIC BLOOD PRESSURE: 133 MMHG | OXYGEN SATURATION: 100 % | DIASTOLIC BLOOD PRESSURE: 80 MMHG | HEART RATE: 72 BPM | SYSTOLIC BLOOD PRESSURE: 133 MMHG | TEMPERATURE: 99.1 F | OXYGEN SATURATION: 100 % | DIASTOLIC BLOOD PRESSURE: 80 MMHG | TEMPERATURE: 99.1 F

## 2025-06-21 NOTE — ED PROVIDER NOTES
Time reflects when diagnosis was documented in both MDM as applicable and the Disposition within this note       Time User Action Codes Description Comment    6/21/2025 12:47 AM Edson Kuo Add [F10.920] Alcoholic intoxication without complication (HCC)     6/21/2025 12:47 AM Edson Kuo [W19.XXXA] Fall, initial encounter     6/21/2025 12:47 AM Edson Kuo [S09.90XA] Closed head injury, initial encounter           ED Disposition       ED Disposition   Discharge    Condition   Stable    Date/Time   Sat Jun 21, 2025 12:47 AM    Comment   Oli Duterval discharge to home/self care.                   Assessment & Plan       Medical Decision Making  54 year old male presenting with AMS and Fall.  He is difficult to examine here.  Differential diagnosis at this time includes intoxication versus ischemic versus hemorrhagic stroke versus skull fracture versus subdural.  Will do CT head and neck with and without contrast to evaluate.  See ED course but patient's friend came by after initial CT scan and said that he has been drinking.  Will wait for CT results and then ambulate here and see if he can ambulate on his own if he can he can go home.    Patient ambulating in the department that any difficulty.  CT scan was otherwise unremarkable.  Patient discharged home.  Counseled on alcohol use.  Return precautions discussed.    Amount and/or Complexity of Data Reviewed  Labs: ordered. Decision-making details documented in ED Course.  Radiology: ordered.    Risk  Prescription drug management.        ED Course as of 06/21/25 0123   Fri Jun 20, 2025   2258 POC Glucose: 83  WNL   2259 Procedure Note: EKG  Date/Time: 06/20/25 10:59 PM   Interpreted by: Edson uKo   Indications / Diagnosis: Syncope  ECG reviewed by me, the ED Provider: yes   The EKG demonstrates:  Rhythm: normal sinus  Intervals: normal intervals  Axis: normal axis  QRS/Blocks: normal QRS  ST Changes: No acute ST Changes, no STD/ABBIE.     2327 CBC  and differential(!)  Per review of patients chart that is merged his Hgb is always around 11.   2327 MCV(!): 106  Likely element of B12 deficiency given his alcohol use   2328 Friend came by to check on patient.  He told me that the patient drank a pint of whiskey by himself.   Sat Jun 21, 2025   0007 Basic metabolic panel(!)  No actionable derangements.   0010 Patient now alert and oriented x3 after being scolded by his friend. Will await results of CTA and then plan to ambulate patient.       Medications   iohexol (OMNIPAQUE) 350 MG/ML injection (MULTI-DOSE) 100 mL (85 mL Intravenous Given 6/20/25 2321)   thiamine (VITAMIN B1) 100 mg in dextrose 5 % 100 mL IVPB (0 mg Intravenous Stopped 6/21/25 0018)   folic acid (FOLVITE) tablet 1 mg (1 mg Oral Given 6/20/25 2351)   thiamine (VITAMIN B1) 100 mg/mL injection **ADS Override Pull** (  Override Pull 6/20/25 2346)   thiamine (VITAMIN B1) 100 mg/mL injection **ADS Override Pull** (  Override Pull 6/20/25 2346)       ED Risk Strat Scores                    No data recorded        SBIRT 20yo+      Flowsheet Row Most Recent Value   Initial Alcohol Screen: US AUDIT-C     1. How often do you have a drink containing alcohol? 6 Filed at: 06/20/2025 2355   2. How many drinks containing alcohol do you have on a typical day you are drinking?  6 Filed at: 06/20/2025 2355   3a. Male UNDER 65: How often do you have five or more drinks on one occasion? 6 Filed at: 06/20/2025 2355   Audit-C Score 18 Filed at: 06/20/2025 2355   Full Alcohol Screen: US AUDIT    4. How often during the last year have you found that you were not able to stop drinking once you had started? 0 Filed at: 06/20/2025 2355   5. How often during past year have you failed to do what was normally expected of you because of drinking?  0 Filed at: 06/20/2025 2355   6. How often in past year have you needed a first drink in the morning to get yourself going after a heavy drinking session?  0 Filed at: 06/20/2025 3179    7. How often in past year have you had feeling of guilt or remorse after drinking?  0 Filed at: 06/20/2025 2355   8. How often in past year have you been unable to remember what happened night before because you had been drinking?  0 Filed at: 06/20/2025 2355   9. Have you or someone else been injured as a result of your drinking?  0 Filed at: 06/20/2025 2355   10. Has a relative, friend, doctor or other health worker been concerned about your drinking and suggested you cut down?  0 Filed at: 06/20/2025 2355   AUDIT Total Score 18 Filed at: 06/20/2025 2355   DAMIÁN: How many times in the past year have you...    Used an illegal drug or used a prescription medication for non-medical reasons? Never Filed at: 06/20/2025 2355                            History of Present Illness       Chief Complaint   Patient presents with    Altered Mental Status     Pt arrived at ED to  relative and was acting confused and altered in ER waiting area per registration staff. Per security pt laid himself down on waiting room floor (no fall). Pt not following instructions/commands at triage       Past Medical History[1]   Past Surgical History[2]   Family History[3]   Social History[4]   No existing history information found.   No existing history information found.   I have reviewed and agree with the history as documented.     54 year old male with unknown medical history presenting to the ER today for AMS + Fall.  He stumbled into the ER and he fell outside in the waiting room.   He was asking for his wife.  Refusing to answer any other questions.  No obvious signs of trauma.    Friend comes later and says that they were intoxicated.         Review of Systems   Unable to perform ROS: Mental status change           Objective       ED Triage Vitals [06/20/25 0337]   Temperature Pulse Blood Pressure Respirations SpO2 Patient Position - Orthostatic VS   99.1 °F (37.3 °C) 88 123/75 20 98 % Lying      Temp Source Heart Rate Source BP  Location FiO2 (%) Pain Score    Oral Monitor Left arm -- --      Vitals      Date and Time Temp Pulse SpO2 Resp BP Pain Score FACES Pain Rating User   06/21/25 0030 -- 72 100 % 18 133/80 -- -- MB   06/21/25 0000 -- 78 100 % 18 119/82 -- --    06/20/25 2330 -- 75 100 % 17 125/84 -- --    06/20/25 2323 -- 75 98 % 17 126/77 -- --    06/20/25 2315 -- 80 100 % 16 111/77 -- -- MB   06/20/25 2257 99.1 °F (37.3 °C) 88 98 % 20 123/75 -- -- RCC            Physical Exam  Vitals and nursing note reviewed.   Constitutional:       General: He is not in acute distress.     Appearance: Normal appearance. He is not ill-appearing.   HENT:      Head: Normocephalic and atraumatic.      Right Ear: External ear normal.      Left Ear: External ear normal.      Nose: Nose normal. No congestion.      Mouth/Throat:      Mouth: Mucous membranes are moist.      Pharynx: Oropharynx is clear. No oropharyngeal exudate.     Eyes:      General:         Right eye: No discharge.         Left eye: No discharge.      Extraocular Movements: Extraocular movements intact.      Conjunctiva/sclera: Conjunctivae normal.      Pupils: Pupils are equal, round, and reactive to light.       Cardiovascular:      Rate and Rhythm: Normal rate and regular rhythm.      Pulses: Normal pulses.      Heart sounds: Normal heart sounds.   Pulmonary:      Effort: Pulmonary effort is normal. No respiratory distress.      Breath sounds: Normal breath sounds. No wheezing.   Abdominal:      General: Abdomen is flat. Bowel sounds are normal. There is no distension.      Palpations: Abdomen is soft.      Tenderness: There is no abdominal tenderness.     Musculoskeletal:         General: No swelling or deformity. Normal range of motion.      Cervical back: Normal range of motion. No rigidity.     Skin:     General: Skin is warm and dry.      Capillary Refill: Capillary refill takes less than 2 seconds.     Neurological:      General: No focal deficit present.      Mental  Status: He is alert and oriented to person, place, and time. Mental status is at baseline.      Comments: Patient with unsteady gait. He fell in the waiting room.  Difficult to examine. He withdraws to pain b/l in upper and lower extremities.  PERRLA.   Psychiatric:         Mood and Affect: Mood normal.         Behavior: Behavior normal.         Results Reviewed       Procedure Component Value Units Date/Time    Basic metabolic panel [[id number]]  (Abnormal) Collected: 06/20/25 2301    Lab Status: Final result Specimen: Blood from Arm, Right Updated: 06/20/25 2348     Sodium 141 mmol/L      Potassium 4.2 mmol/L      Chloride 109 mmol/L      CO2 23 mmol/L      ANION GAP 9 mmol/L      BUN 12 mg/dL      Creatinine 0.71 mg/dL      Glucose 86 mg/dL      Calcium 7.9 mg/dL      eGFR 104 ml/min/1.73sq m     Narrative:      National Kidney Disease Foundation guidelines for Chronic Kidney Disease (CKD):     Stage 1 with normal or high GFR (GFR > 90 mL/min/1.73 square meters)    Stage 2 Mild CKD (GFR = 60-89 mL/min/1.73 square meters)    Stage 3A Moderate CKD (GFR = 45-59 mL/min/1.73 square meters)    Stage 3B Moderate CKD (GFR = 30-44 mL/min/1.73 square meters)    Stage 4 Severe CKD (GFR = 15-29 mL/min/1.73 square meters)    Stage 5 End Stage CKD (GFR <15 mL/min/1.73 square meters)  Note: GFR calculation is accurate only with a steady state creatinine    CBC and differential [[id number]]  (Abnormal) Collected: 06/20/25 2301    Lab Status: Final result Specimen: Blood from Arm, Right Updated: 06/20/25 2324     WBC 5.39 Thousand/uL      RBC 2.60 Million/uL      Hemoglobin 10.6 g/dL      Hematocrit 27.6 %       fL      MCH 40.8 pg      MCHC 38.2 g/dL      RDW 16.1 %      MPV 10.2 fL      Platelets 235 Thousands/uL      nRBC 2 /100 WBCs      Segmented % 27 %      Immature Grans % 0 %      Lymphocytes % 61 %      Monocytes % 10 %      Eosinophils Relative 1 %      Basophils Relative 1 %      Absolute Neutrophils 1.45  Thousands/µL      Absolute Immature Grans 0.01 Thousand/uL      Absolute Lymphocytes 3.32 Thousands/µL      Absolute Monocytes 0.55 Thousand/µL      Eosinophils Absolute 0.03 Thousand/µL      Basophils Absolute 0.03 Thousands/µL     Fingerstick Glucose (POCT) [[id number]]  (Normal) Collected: 06/20/25 2256    Lab Status: Final result Specimen: Blood Updated: 06/20/25 2257     POC Glucose 83 mg/dl             CTA head and neck with and without contrast   Final Interpretation by Shant Evangelista DO (06/21 0045)   CT Brain: No acute intracranial abnormality.      CT Angiography:   0% stenosis of the bilateral cervical internal carotid arteries by NASCET criteria. Patent vertebral arteries.   No evidence of large intracranial aneurysm, high-grade stenosis, or large vessel occlusion.      See full report for additional findings.                        Workstation performed: [id number]             Procedures    ED Medication and Procedure Management   None     There are no discharge medications for this patient.    No discharge procedures on file.  ED SEPSIS DOCUMENTATION   Time reflects when diagnosis was documented in both MDM as applicable and the Disposition within this note       Time User Action Codes Description Comment    6/21/2025 12:47 AM Edson Kuo [F10.920] Alcoholic intoxication without complication (HCC)     6/21/2025 12:47 AM Edson Kuo [W19.XXXA] Fall, initial encounter     6/21/2025 12:47 AM Edson Kuo [S09.90XA] Closed head injury, initial encounter                    [1] No past medical history on file.  [2] No past surgical history on file.  [3] No family history on file.  [4]         Edson Kuo MD  06/21/25 0124

## 2025-06-21 NOTE — DISCHARGE INSTRUCTIONS
You should be more careful with your alcohol use.  Please follow-up with your primary care doctor.    Return to ER if develop new or worrisome symptoms.

## 2025-06-23 LAB
ATRIAL RATE: 77 BPM
P AXIS: 60 DEGREES
PR INTERVAL: 154 MS
QRS AXIS: 9 DEGREES
QRSD INTERVAL: 78 MS
QT INTERVAL: 428 MS
QTC INTERVAL: 484 MS
T WAVE AXIS: 39 DEGREES
VENTRICULAR RATE: 77 BPM

## 2025-06-23 PROCEDURE — 93010 ELECTROCARDIOGRAM REPORT: CPT | Performed by: INTERNAL MEDICINE

## 2025-06-28 ENCOUNTER — HOSPITAL ENCOUNTER (EMERGENCY)
Facility: HOSPITAL | Age: 55
Discharge: HOME | End: 2025-06-28
Attending: INTERNAL MEDICINE | Admitting: INTERNAL MEDICINE
Payer: COMMERCIAL

## 2025-06-28 ENCOUNTER — APPOINTMENT (EMERGENCY)
Dept: CT IMAGING | Facility: HOSPITAL | Age: 55
End: 2025-06-28
Payer: COMMERCIAL

## 2025-06-28 VITALS
TEMPERATURE: 98.2 F | DIASTOLIC BLOOD PRESSURE: 67 MMHG | OXYGEN SATURATION: 99 % | SYSTOLIC BLOOD PRESSURE: 125 MMHG | RESPIRATION RATE: 16 BRPM | HEART RATE: 80 BPM

## 2025-06-28 DIAGNOSIS — K60.30 PERIANAL FISTULA: Primary | ICD-10-CM

## 2025-06-28 LAB
ALBUMIN SERPL BCG-MCNC: 2.3 G/DL (ref 3.5–5)
ALP SERPL-CCNC: 129 U/L (ref 34–104)
ALT SERPL W P-5'-P-CCNC: 24 U/L (ref 7–52)
ANION GAP SERPL CALCULATED.3IONS-SCNC: 4 MMOL/L (ref 4–13)
ANISOCYTOSIS BLD QL SMEAR: PRESENT
AST SERPL W P-5'-P-CCNC: 34 U/L (ref 13–39)
BASOPHILS # BLD MANUAL: 0 THOUSAND/UL (ref 0–0.1)
BASOPHILS NFR MAR MANUAL: 0 % (ref 0–1)
BILIRUB SERPL-MCNC: 0.68 MG/DL (ref 0.2–1)
BUN SERPL-MCNC: 16 MG/DL (ref 5–25)
CALCIUM ALBUM COR SERPL-MCNC: 9.1 MG/DL (ref 8.3–10.1)
CALCIUM SERPL-MCNC: 7.7 MG/DL (ref 8.4–10.2)
CHLORIDE SERPL-SCNC: 107 MMOL/L (ref 96–108)
CO2 SERPL-SCNC: 31 MMOL/L (ref 21–32)
CREAT SERPL-MCNC: 0.77 MG/DL (ref 0.6–1.3)
EOSINOPHIL # BLD MANUAL: 0.1 THOUSAND/UL (ref 0–0.4)
EOSINOPHIL NFR BLD MANUAL: 2 % (ref 0–6)
ERYTHROCYTE [DISTWIDTH] IN BLOOD BY AUTOMATED COUNT: 15.7 % (ref 11.6–15.1)
GFR SERPL CREATININE-BSD FRML MDRD: 101 ML/MIN/1.73SQ M
GLUCOSE SERPL-MCNC: 79 MG/DL (ref 65–140)
HCT VFR BLD AUTO: 25 % (ref 36.5–49.3)
HGB BLD-MCNC: 9.5 G/DL (ref 12–17)
HYPERCHROMIA BLD QL SMEAR: PRESENT
LACTATE SERPL-SCNC: 1.1 MMOL/L (ref 0.5–2)
LYMPHOCYTES # BLD AUTO: 1.33 THOUSAND/UL (ref 0.6–4.47)
LYMPHOCYTES # BLD AUTO: 24 % (ref 14–44)
MACROCYTES BLD QL AUTO: PRESENT
MCH RBC QN AUTO: 40.3 PG (ref 26.8–34.3)
MCHC RBC AUTO-ENTMCNC: 38 G/DL (ref 31.4–37.4)
MCV RBC AUTO: 106 FL (ref 82–98)
MICROCYTES BLD QL AUTO: PRESENT
MONOCYTES # BLD AUTO: 0.36 THOUSAND/UL (ref 0–1.22)
MONOCYTES NFR BLD: 7 % (ref 4–12)
NEUTROPHILS # BLD MANUAL: 3.33 THOUSAND/UL (ref 1.85–7.62)
NEUTS SEG NFR BLD AUTO: 65 % (ref 43–75)
NRBC BLD AUTO-RTO: 1 /100 WBC (ref 0–2)
PLATELET # BLD AUTO: 271 THOUSANDS/UL (ref 149–390)
PLATELET BLD QL SMEAR: ADEQUATE
PMV BLD AUTO: 10 FL (ref 8.9–12.7)
POIKILOCYTOSIS BLD QL SMEAR: PRESENT
POLYCHROMASIA BLD QL SMEAR: PRESENT
POTASSIUM SERPL-SCNC: 3.9 MMOL/L (ref 3.5–5.3)
PROCALCITONIN SERPL-MCNC: 0.08 NG/ML
PROT SERPL-MCNC: 5.5 G/DL (ref 6.4–8.4)
RBC # BLD AUTO: 2.36 MILLION/UL (ref 3.88–5.62)
RBC MORPH BLD: PRESENT
SODIUM SERPL-SCNC: 142 MMOL/L (ref 135–147)
TARGETS BLD QL SMEAR: PRESENT
VARIANT LYMPHS # BLD AUTO: 2 %
WBC # BLD AUTO: 5.12 THOUSAND/UL (ref 4.31–10.16)

## 2025-06-28 PROCEDURE — 99283 EMERGENCY DEPT VISIT LOW MDM: CPT

## 2025-06-28 PROCEDURE — 99284 EMERGENCY DEPT VISIT MOD MDM: CPT | Performed by: INTERNAL MEDICINE

## 2025-06-28 PROCEDURE — 85027 COMPLETE CBC AUTOMATED: CPT | Performed by: INTERNAL MEDICINE

## 2025-06-28 PROCEDURE — 96374 THER/PROPH/DIAG INJ IV PUSH: CPT

## 2025-06-28 PROCEDURE — 87040 BLOOD CULTURE FOR BACTERIA: CPT | Performed by: INTERNAL MEDICINE

## 2025-06-28 PROCEDURE — 74177 CT ABD & PELVIS W/CONTRAST: CPT

## 2025-06-28 PROCEDURE — 36415 COLL VENOUS BLD VENIPUNCTURE: CPT | Performed by: INTERNAL MEDICINE

## 2025-06-28 PROCEDURE — 80053 COMPREHEN METABOLIC PANEL: CPT | Performed by: INTERNAL MEDICINE

## 2025-06-28 PROCEDURE — 96361 HYDRATE IV INFUSION ADD-ON: CPT

## 2025-06-28 PROCEDURE — 84145 PROCALCITONIN (PCT): CPT | Performed by: INTERNAL MEDICINE

## 2025-06-28 PROCEDURE — 85007 BL SMEAR W/DIFF WBC COUNT: CPT | Performed by: INTERNAL MEDICINE

## 2025-06-28 PROCEDURE — 83605 ASSAY OF LACTIC ACID: CPT | Performed by: INTERNAL MEDICINE

## 2025-06-28 RX ORDER — MORPHINE SULFATE 4 MG/ML
4 INJECTION, SOLUTION INTRAMUSCULAR; INTRAVENOUS ONCE
Status: COMPLETED | OUTPATIENT
Start: 2025-06-28 | End: 2025-06-28

## 2025-06-28 RX ADMIN — MORPHINE SULFATE 4 MG: 4 INJECTION INTRAVENOUS at 08:05

## 2025-06-28 RX ADMIN — IOHEXOL 80 ML: 350 INJECTION, SOLUTION INTRAVENOUS at 08:54

## 2025-06-28 RX ADMIN — SODIUM CHLORIDE 1000 ML: 0.9 INJECTION, SOLUTION INTRAVENOUS at 08:03

## 2025-06-28 NOTE — ED NOTES
Patient is resting comfortably.denies any needs at this time     Marsha Rojas RN  06/28/25 0955       Marsha Rojas RN  06/28/25 0955

## 2025-06-28 NOTE — ED NOTES
Patient is resting comfortably., warm blankets applied, and call bell within reach     Marsha Rojas RN  06/28/25 [id number]
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
## 2025-06-28 NOTE — DISCHARGE INSTRUCTIONS
Follow up with colorectal dr Norwood   Take tylenol or motrin for pain as needed.  Labs Reviewed   CBC AND DIFFERENTIAL - Abnormal       Result Value Ref Range Status    WBC 5.12  4.31 - 10.16 Thousand/uL Final    RBC 2.36 (*) 3.88 - 5.62 Million/uL Final    Comment: Not Hemolyzed or Lipemic    Hemoglobin 9.5 (*) 12.0 - 17.0 g/dL Final    Hematocrit 25.0 (*) 36.5 - 49.3 % Final     (*) 82 - 98 fL Final    MCH 40.3 (*) 26.8 - 34.3 pg Final    MCHC 38.0 (*) 31.4 - 37.4 g/dL Final    RDW 15.7 (*) 11.6 - 15.1 % Final    MPV 10.0  8.9 - 12.7 fL Final    Platelets 271  149 - 390 Thousands/uL Final   COMPREHENSIVE METABOLIC PANEL - Abnormal    Sodium 142  135 - 147 mmol/L Final    Potassium 3.9  3.5 - 5.3 mmol/L Final    Chloride 107  96 - 108 mmol/L Final    CO2 31  21 - 32 mmol/L Final    ANION GAP 4  4 - 13 mmol/L Final    BUN 16  5 - 25 mg/dL Final    Creatinine 0.77  0.60 - 1.30 mg/dL Final    Comment: Standardized to IDMS reference method    Glucose 79  65 - 140 mg/dL Final    Comment: If the patient is fasting, the ADA then defines impaired fasting glucose as > 100 mg/dL and diabetes as > or equal to 123 mg/dL.    Calcium 7.7 (*) 8.4 - 10.2 mg/dL Final    Corrected Calcium 9.1  8.3 - 10.1 mg/dL Final    AST 34  13 - 39 U/L Final    ALT 24  7 - 52 U/L Final    Comment: Specimen collection should occur prior to Sulfasalazine administration due to the potential for falsely depressed results.     Alkaline Phosphatase 129 (*) 34 - 104 U/L Final    Total Protein 5.5 (*) 6.4 - 8.4 g/dL Final    Albumin 2.3 (*) 3.5 - 5.0 g/dL Final    Total Bilirubin 0.68  0.20 - 1.00 mg/dL Final    Comment: Use of this assay is not recommended for patients undergoing treatment with eltrombopag due to the potential for falsely elevated results.  N-acetyl-p-benzoquinone imine (metabolite of Acetaminophen) will generate erroneously low results in samples for patients that have taken an overdose of Acetaminophen.    eGFR 101   ml/min/1.73sq m Final    Narrative:     National Kidney Disease Foundation guidelines for Chronic Kidney Disease (CKD):     Stage 1 with normal or high GFR (GFR > 90 mL/min/1.73 square meters)    Stage 2 Mild CKD (GFR = 60-89 mL/min/1.73 square meters)    Stage 3A Moderate CKD (GFR = 45-59 mL/min/1.73 square meters)    Stage 3B Moderate CKD (GFR = 30-44 mL/min/1.73 square meters)    Stage 4 Severe CKD (GFR = 15-29 mL/min/1.73 square meters)    Stage 5 End Stage CKD (GFR <15 mL/min/1.73 square meters)  Note: GFR calculation is accurate only with a steady state creatinine   MANUAL DIFFERENTIAL(PHLEBS DO NOT ORDER) - Abnormal    Segmented % 65  43 - 75 % Final    Lymphocytes % 24  14 - 44 % Final    Monocytes % 7  4 - 12 % Final    Eosinophils % 2  0 - 6 % Final    Basophils % 0  0 - 1 % Final    Atypical Lymphocytes % 2 (*) <=0 % Final    Absolute Neutrophils 3.33  1.85 - 7.62 Thousand/uL Final    Absolute Lymphocytes 1.33  0.60 - 4.47 Thousand/uL Final    Absolute Monocytes 0.36  0.00 - 1.22 Thousand/uL Final    Absolute Eosinophils 0.10  0.00 - 0.40 Thousand/uL Final    Absolute Basophils 0.00  0.00 - 0.10 Thousand/uL Final    Total Counted     Final    nRBC 1  0 - 2 /100 WBC Final    RBC Morphology Present   Final    Platelet Estimate Adequate  Adequate Final    Anisocytosis Present   Final    Hypochromia Present   Final    Macrocytes Present   Final    Microcytes Present   Final    Poikilocytes Present   Final    Polychromasia Present   Final    Target Cells Present   Final   LACTIC ACID, PLASMA (W/REFLEX IF RESULT > 2.0) - Normal    LACTIC ACID 1.1  0.5 - 2.0 mmol/L Final    Narrative:     Result may be elevated if tourniquet was used during collection.   PROCALCITONIN TEST - Normal    Procalcitonin 0.08  <=0.25 ng/ml Final    Comment: Suspected Lower Respiratory Tract Infection (LRTI):  - LESS than or EQUAL to 0.25 ng/mL:   low likelihood for bacterial LRTI; antibiotics DISCOURAGED.  - GREATER than 0.25 ng/mL:    increased likelihood for bacterial LRTI; antibiotics ENCOURAGED.    Suspected Sepsis:  - Strongly consider initiating antibiotics in ALL UNSTABLE patients.  - LESS than or EQUAL to 0.5 ng/mL:   low likelihood for bacterial sepsis; antibiotics DISCOURAGED.  - GREATER than 0.5 ng/mL:   increased likelihood for bacterial sepsis; antibiotics ENCOURAGED.  - GREATER than 2 ng/mL:   high risk for severe sepsis / septic shock; antibiotics strongly ENCOURAGED.    Decisions on antibiotic use should not be based solely on Procalcitonin (PCT) levels. If PCT is low but uncertainty exists with stopping antibiotics, repeat PCT in 6-24 hours to confirm the low level. If antibiotics are administered (regardless if initial PCT was high or low), repeat PCT every 1-2 days to consider early antibiotic cessation (when GREATER than 80% decrease from the peak OR when PCT drops below designated cutoffs, whichever comes first), so long as the infection is NOT one that typically requires prolonged treatment durations (e.g., bone/joint infections, endocarditis, Staph. aureus bacteremia).    Situations of FALSE-POSITIVE Procalcitonin values:  1) Newborns < 72 hours old  2) Massive stress from severe trauma / burns, major surgery, acute pancreatitis, cardiogenic / hemorrhagic shock, sickle cell crisis, or other organ perfusion abnormalities  3) Malaria and some Candidal infections  4) Treatment with agents that stimulate cytokines (e.g., OKT3, anti-lymphocyte globulins, alemtuzumab, IL-2, granulocyte transfusion [NOT GCSFs])  5) Chronic renal disease causes elevated baseline levels (consider GREATER than 0.75 ng/mL as an abnormal cut-off); initiating HD/CRRT may cause transient decreases  6) Paraneoplastic syndromes from medullary thyroid or SCLC, some forms of vasculitis, and acute [id number] disease    Situations of FALSE-NEGATIVE Procalcitonin values:  1) Too early in clinical course for PCT to have reached its peak (may repeat in 6-24  hours to confirm low level)  2) Localized infection WITHOUT systemic (SIRS / sepsis) response (e.g., an abscess, osteomyelitis, cystitis)  3) Mycobacteria (e.g., Tuberculosis, MAC)  4) Cystic fibrosis exacerbations     BLOOD CULTURE   BLOOD CULTURE   RBC MORPHOLOGY REFLEX TEST     CT abdomen pelvis with contrast   Final Result      There is a seton in place in the rectum, and bilateral perianal fistula tracts, right greater than left, without evidence of drainable abscess (series 201 images 153-177.) Evaluation of perineal fistula can be better made with MRI of the pelvis, with and    without IV contrast, perianal fistula protocol.         Workstation performed: [id number]

## 2025-07-03 LAB
BACTERIA BLD CULT: NORMAL
BACTERIA BLD CULT: NORMAL

## 2025-07-10 ENCOUNTER — TELEPHONE (OUTPATIENT)
Age: 55
End: 2025-07-10

## 2025-07-10 NOTE — TELEPHONE ENCOUNTER
Patients CRS provider:      Number to return call: [phone]    Reason for call: Pt's wife called in regard to scheduling an appointment due to pt is experiencing rectal issues. Call was transferred to [facility] to assist.     Scheduled procedure/appointment date if applicable: 8/14/2025

## 2025-07-14 ENCOUNTER — HOSPITAL ENCOUNTER (EMERGENCY)
Facility: HOSPITAL | Age: 55
Discharge: HOME | End: 2025-07-14
Attending: EMERGENCY MEDICINE | Admitting: EMERGENCY MEDICINE
Payer: COMMERCIAL

## 2025-07-14 ENCOUNTER — APPOINTMENT (EMERGENCY)
Dept: RADIOLOGY | Facility: HOSPITAL | Age: 55
End: 2025-07-14
Payer: COMMERCIAL

## 2025-07-14 VITALS
RESPIRATION RATE: 16 BRPM | HEART RATE: 72 BPM | TEMPERATURE: 97.3 F | SYSTOLIC BLOOD PRESSURE: 125 MMHG | DIASTOLIC BLOOD PRESSURE: 66 MMHG | OXYGEN SATURATION: 98 %

## 2025-07-14 DIAGNOSIS — K61.1 PERIRECTAL ABSCESS: ICD-10-CM

## 2025-07-14 DIAGNOSIS — L73.2 HIDRADENITIS SUPPURATIVA OF ANUS: ICD-10-CM

## 2025-07-14 DIAGNOSIS — K61.0 PERIANAL ABSCESS: Primary | ICD-10-CM

## 2025-07-14 LAB
ANION GAP SERPL CALCULATED.3IONS-SCNC: 6 MMOL/L (ref 4–13)
ANISOCYTOSIS BLD QL SMEAR: PRESENT
BASOPHILS # BLD MANUAL: 0.08 THOUSAND/UL (ref 0–0.1)
BASOPHILS NFR MAR MANUAL: 1 % (ref 0–1)
BUN SERPL-MCNC: 14 MG/DL (ref 5–25)
CALCIUM SERPL-MCNC: 7.5 MG/DL (ref 8.4–10.2)
CHLORIDE SERPL-SCNC: 111 MMOL/L (ref 96–108)
CO2 SERPL-SCNC: 25 MMOL/L (ref 21–32)
CREAT SERPL-MCNC: 0.79 MG/DL (ref 0.6–1.3)
EOSINOPHIL # BLD MANUAL: 0.08 THOUSAND/UL (ref 0–0.4)
EOSINOPHIL NFR BLD MANUAL: 1 % (ref 0–6)
ERYTHROCYTE [DISTWIDTH] IN BLOOD BY AUTOMATED COUNT: 15.8 % (ref 11.6–15.1)
GFR SERPL CREATININE-BSD FRML MDRD: 100 ML/MIN/1.73SQ M
GLUCOSE SERPL-MCNC: 80 MG/DL (ref 65–140)
HCT VFR BLD AUTO: 23.5 % (ref 36.5–49.3)
HGB BLD-MCNC: 9 G/DL (ref 12–17)
HYPERCHROMIA BLD QL SMEAR: PRESENT
LYMPHOCYTES # BLD AUTO: 2.08 THOUSAND/UL (ref 0.6–4.47)
LYMPHOCYTES # BLD AUTO: 27 % (ref 14–44)
MACROCYTES BLD QL AUTO: PRESENT
MCH RBC QN AUTO: 40.9 PG (ref 26.8–34.3)
MCHC RBC AUTO-ENTMCNC: 38.3 G/DL (ref 31.4–37.4)
MCV RBC AUTO: 107 FL (ref 82–98)
MONOCYTES # BLD AUTO: 0.39 THOUSAND/UL (ref 0–1.22)
MONOCYTES NFR BLD: 5 % (ref 4–12)
NEUTROPHILS # BLD MANUAL: 5.08 THOUSAND/UL (ref 1.85–7.62)
NEUTS SEG NFR BLD AUTO: 66 % (ref 43–75)
NRBC BLD AUTO-RTO: 4 /100 WBC (ref 0–2)
PLATELET # BLD AUTO: 285 THOUSANDS/UL (ref 149–390)
PLATELET BLD QL SMEAR: ADEQUATE
PMV BLD AUTO: 10.3 FL (ref 8.9–12.7)
POIKILOCYTOSIS BLD QL SMEAR: PRESENT
POTASSIUM SERPL-SCNC: 3.9 MMOL/L (ref 3.5–5.3)
RBC # BLD AUTO: 2.2 MILLION/UL (ref 3.88–5.62)
RBC MORPH BLD: PRESENT
SODIUM SERPL-SCNC: 142 MMOL/L (ref 135–147)
TARGETS BLD QL SMEAR: PRESENT
WBC # BLD AUTO: 7.7 THOUSAND/UL (ref 4.31–10.16)

## 2025-07-14 PROCEDURE — 85027 COMPLETE CBC AUTOMATED: CPT

## 2025-07-14 PROCEDURE — 99284 EMERGENCY DEPT VISIT MOD MDM: CPT

## 2025-07-14 PROCEDURE — 74177 CT ABD & PELVIS W/CONTRAST: CPT

## 2025-07-14 PROCEDURE — 36415 COLL VENOUS BLD VENIPUNCTURE: CPT

## 2025-07-14 PROCEDURE — 85007 BL SMEAR W/DIFF WBC COUNT: CPT

## 2025-07-14 PROCEDURE — NC001 PR NO CHARGE: Performed by: COLON & RECTAL SURGERY

## 2025-07-14 PROCEDURE — 80048 BASIC METABOLIC PNL TOTAL CA: CPT

## 2025-07-14 PROCEDURE — 99285 EMERGENCY DEPT VISIT HI MDM: CPT | Performed by: EMERGENCY MEDICINE

## 2025-07-14 RX ADMIN — IOHEXOL 85 ML: 350 INJECTION, SOLUTION INTRAVENOUS at 15:44

## 2025-07-14 NOTE — ASSESSMENT & PLAN NOTE
Hx of perirectal hidradenits, perirectal abscess, perianal abscess  S/p EUA, I&D of ischiorectal abscess, horseshoe abscess and fistula, and posterior midline fistula in Feb. 2025    CT without drainable abscess.    Plan  -No inpatient colorectal attention required  -Patient to follow up with the colorectal team outpatient  -Patient was instructed to show up to the colorectal clinic, not the ED.

## 2025-07-15 ENCOUNTER — NURSE TRIAGE (OUTPATIENT)
Age: 55
End: 2025-07-15

## 2025-07-25 ENCOUNTER — HOSPITAL ENCOUNTER (EMERGENCY)
Facility: HOSPITAL | Age: 55
Discharge: HOME | End: 2025-07-25
Attending: EMERGENCY MEDICINE
Payer: COMMERCIAL

## 2025-07-25 VITALS
WEIGHT: 140.6 LBS | HEIGHT: 71 IN | HEART RATE: 103 BPM | OXYGEN SATURATION: 98 % | DIASTOLIC BLOOD PRESSURE: 92 MMHG | SYSTOLIC BLOOD PRESSURE: 134 MMHG | BODY MASS INDEX: 19.69 KG/M2 | RESPIRATION RATE: 18 BRPM | TEMPERATURE: 98.4 F

## 2025-07-25 DIAGNOSIS — K61.1 ABSCESS, PERIRECTAL: Primary | ICD-10-CM

## 2025-07-25 PROCEDURE — 99284 EMERGENCY DEPT VISIT MOD MDM: CPT

## 2025-07-25 PROCEDURE — 99284 EMERGENCY DEPT VISIT MOD MDM: CPT | Performed by: EMERGENCY MEDICINE

## 2025-07-25 RX ORDER — OXYCODONE HYDROCHLORIDE 5 MG/1
5 TABLET ORAL ONCE
Refills: 0 | Status: COMPLETED | OUTPATIENT
Start: 2025-07-25 | End: 2025-07-25

## 2025-07-25 RX ORDER — OXYCODONE HYDROCHLORIDE 5 MG/1
5 TABLET ORAL EVERY 6 HOURS PRN
Qty: 12 TABLET | Refills: 0 | Status: SHIPPED | OUTPATIENT
Start: 2025-07-25 | End: 2025-08-04

## 2025-07-25 RX ADMIN — OXYCODONE HYDROCHLORIDE 5 MG: 5 TABLET ORAL at 09:41

## 2025-07-25 NOTE — DISCHARGE INSTRUCTIONS
As discussed, you must follow-up with colorectal surgery as you were previously instructed to.  Call office today to arrange follow-up.
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
## 2025-07-29 ENCOUNTER — TELEPHONE (OUTPATIENT)
Age: 55
End: 2025-07-29

## 2025-08-07 ENCOUNTER — NURSE TRIAGE (OUTPATIENT)
Age: 55
End: 2025-08-07

## 2025-08-07 ENCOUNTER — TELEPHONE (OUTPATIENT)
Age: 55
End: 2025-08-07

## 2025-08-14 ENCOUNTER — TELEPHONE (OUTPATIENT)
Age: 55
End: 2025-08-14

## 2025-08-14 ENCOUNTER — OFFICE VISIT (OUTPATIENT)
Age: 55
End: 2025-08-14
Payer: COMMERCIAL

## (undated) DEVICE — 3M™ DURAPORE™ SURGICAL TAPE 1538-3, 3 INCH X 10 YARD (7,5CM X 9,1M), 4 ROLLS/BOX: Brand: 3M™ DURAPORE™

## (undated) DEVICE — STERILE LUBRICATING JELLY, TUBE: Brand: HR LUBRICATING JELLY

## (undated) DEVICE — POOLE SUCTION HANDLE: Brand: CARDINAL HEALTH

## (undated) DEVICE — INTENDED FOR TISSUE SEPARATION, AND OTHER PROCEDURES THAT REQUIRE A SHARP SURGICAL BLADE TO PUNCTURE OR CUT.: Brand: BARD-PARKER SAFETY BLADES SIZE 11, STERILE

## (undated) DEVICE — GAUZE SPONGES,16 PLY: Brand: CURITY

## (undated) DEVICE — GLOVE SRG BIOGEL 7.5

## (undated) DEVICE — BETHLEHEM UNIVERSAL MINOR GEN: Brand: CARDINAL HEALTH

## (undated) DEVICE — BASIC SINGLE BASIN 2-LF: Brand: MEDLINE INDUSTRIES, INC.

## (undated) DEVICE — SPONGE STICK WITH PVP-I: Brand: KENDALL

## (undated) DEVICE — PENCIL ELECTROSURG E-Z CLEAN -0035H

## (undated) DEVICE — DISPOSABLE BRIEF/UNDERWEAR

## (undated) DEVICE — DECANTER: Brand: UNBRANDED

## (undated) DEVICE — TUBING SUCTION 5MM X 12 FT

## (undated) DEVICE — GLOVE INDICATOR PI UNDERGLOVE SZ 8 BLUE

## (undated) DEVICE — INTENDED FOR TISSUE SEPARATION, AND OTHER PROCEDURES THAT REQUIRE A SHARP SURGICAL BLADE TO PUNCTURE OR CUT.: Brand: BARD-PARKER SAFETY BLADES SIZE 10, STERILE